# Patient Record
Sex: FEMALE | Race: WHITE | URBAN - METROPOLITAN AREA
[De-identification: names, ages, dates, MRNs, and addresses within clinical notes are randomized per-mention and may not be internally consistent; named-entity substitution may affect disease eponyms.]

---

## 2017-01-31 ENCOUNTER — OUTPATIENT (OUTPATIENT)
Dept: OUTPATIENT SERVICES | Facility: HOSPITAL | Age: 37
LOS: 1 days | Discharge: HOME | End: 2017-01-31

## 2017-06-12 ENCOUNTER — OUTPATIENT (OUTPATIENT)
Dept: OUTPATIENT SERVICES | Facility: HOSPITAL | Age: 37
LOS: 1 days | Discharge: HOME | End: 2017-06-12

## 2017-06-12 DIAGNOSIS — O99.89 OTHER SPECIFIED DISEASES AND CONDITIONS COMPLICATING PREGNANCY, CHILDBIRTH AND THE PUERPERIUM: ICD-10-CM

## 2017-06-28 DIAGNOSIS — F10.10 ALCOHOL ABUSE, UNCOMPLICATED: ICD-10-CM

## 2017-09-08 ENCOUNTER — OUTPATIENT (OUTPATIENT)
Dept: OUTPATIENT SERVICES | Facility: HOSPITAL | Age: 37
LOS: 1 days | Discharge: HOME | End: 2017-09-08

## 2017-09-08 DIAGNOSIS — F10.10 ALCOHOL ABUSE, UNCOMPLICATED: ICD-10-CM

## 2017-09-08 DIAGNOSIS — O99.89 OTHER SPECIFIED DISEASES AND CONDITIONS COMPLICATING PREGNANCY, CHILDBIRTH AND THE PUERPERIUM: ICD-10-CM

## 2017-09-12 ENCOUNTER — OUTPATIENT (OUTPATIENT)
Dept: OUTPATIENT SERVICES | Facility: HOSPITAL | Age: 37
LOS: 1 days | Discharge: HOME | End: 2017-09-12

## 2017-09-12 DIAGNOSIS — F10.10 ALCOHOL ABUSE, UNCOMPLICATED: ICD-10-CM

## 2017-09-12 DIAGNOSIS — O99.89 OTHER SPECIFIED DISEASES AND CONDITIONS COMPLICATING PREGNANCY, CHILDBIRTH AND THE PUERPERIUM: ICD-10-CM

## 2017-10-25 ENCOUNTER — OUTPATIENT (OUTPATIENT)
Dept: OUTPATIENT SERVICES | Facility: HOSPITAL | Age: 37
LOS: 1 days | Discharge: HOME | End: 2017-10-25

## 2017-10-25 DIAGNOSIS — F10.10 ALCOHOL ABUSE, UNCOMPLICATED: ICD-10-CM

## 2017-10-25 DIAGNOSIS — O99.89 OTHER SPECIFIED DISEASES AND CONDITIONS COMPLICATING PREGNANCY, CHILDBIRTH AND THE PUERPERIUM: ICD-10-CM

## 2017-12-20 ENCOUNTER — OUTPATIENT (OUTPATIENT)
Dept: OUTPATIENT SERVICES | Facility: HOSPITAL | Age: 37
LOS: 1 days | Discharge: HOME | End: 2017-12-20

## 2017-12-20 DIAGNOSIS — O99.89 OTHER SPECIFIED DISEASES AND CONDITIONS COMPLICATING PREGNANCY, CHILDBIRTH AND THE PUERPERIUM: ICD-10-CM

## 2017-12-20 DIAGNOSIS — F10.10 ALCOHOL ABUSE, UNCOMPLICATED: ICD-10-CM

## 2018-01-22 ENCOUNTER — OUTPATIENT (OUTPATIENT)
Dept: OUTPATIENT SERVICES | Facility: HOSPITAL | Age: 38
LOS: 1 days | Discharge: HOME | End: 2018-01-22

## 2018-01-22 DIAGNOSIS — F10.10 ALCOHOL ABUSE, UNCOMPLICATED: ICD-10-CM

## 2018-01-23 ENCOUNTER — EMERGENCY (EMERGENCY)
Facility: HOSPITAL | Age: 38
LOS: 0 days | Discharge: HOME | End: 2018-01-23

## 2018-01-23 DIAGNOSIS — F93.0 SEPARATION ANXIETY DISORDER OF CHILDHOOD: ICD-10-CM

## 2018-01-23 DIAGNOSIS — F17.200 NICOTINE DEPENDENCE, UNSPECIFIED, UNCOMPLICATED: ICD-10-CM

## 2018-01-23 DIAGNOSIS — F41.9 ANXIETY DISORDER, UNSPECIFIED: ICD-10-CM

## 2018-01-23 DIAGNOSIS — F10.20 ALCOHOL DEPENDENCE, UNCOMPLICATED: ICD-10-CM

## 2018-01-23 DIAGNOSIS — Z88.2 ALLERGY STATUS TO SULFONAMIDES: ICD-10-CM

## 2018-01-23 DIAGNOSIS — F32.9 MAJOR DEPRESSIVE DISORDER, SINGLE EPISODE, UNSPECIFIED: ICD-10-CM

## 2018-01-23 DIAGNOSIS — F13.20 SEDATIVE, HYPNOTIC OR ANXIOLYTIC DEPENDENCE, UNCOMPLICATED: ICD-10-CM

## 2018-02-02 ENCOUNTER — INPATIENT (INPATIENT)
Facility: HOSPITAL | Age: 38
LOS: 3 days | Discharge: HOME | End: 2018-02-06
Attending: INTERNAL MEDICINE

## 2018-02-02 DIAGNOSIS — F10.20 ALCOHOL DEPENDENCE, UNCOMPLICATED: ICD-10-CM

## 2018-02-02 DIAGNOSIS — F17.200 NICOTINE DEPENDENCE, UNSPECIFIED, UNCOMPLICATED: ICD-10-CM

## 2018-02-02 DIAGNOSIS — F41.1 GENERALIZED ANXIETY DISORDER: ICD-10-CM

## 2018-02-02 DIAGNOSIS — F33.2 MAJOR DEPRESSIVE DISORDER, RECURRENT SEVERE WITHOUT PSYCHOTIC FEATURES: ICD-10-CM

## 2018-02-02 DIAGNOSIS — F13.20 SEDATIVE, HYPNOTIC OR ANXIOLYTIC DEPENDENCE, UNCOMPLICATED: ICD-10-CM

## 2018-02-03 VITALS
HEIGHT: 68 IN | TEMPERATURE: 98 F | HEART RATE: 58 BPM | RESPIRATION RATE: 16 BRPM | SYSTOLIC BLOOD PRESSURE: 107 MMHG | WEIGHT: 184.97 LBS | DIASTOLIC BLOOD PRESSURE: 50 MMHG

## 2018-02-03 RX ORDER — PHENOBARBITAL 60 MG
32.4 TABLET ORAL ONCE
Qty: 0 | Refills: 0 | Status: DISCONTINUED | OUTPATIENT
Start: 2018-02-06 | End: 2018-02-06

## 2018-02-03 RX ORDER — NICOTINE POLACRILEX 2 MG
1 GUM BUCCAL DAILY
Qty: 0 | Refills: 0 | Status: DISCONTINUED | OUTPATIENT
Start: 2018-02-03 | End: 2018-02-06

## 2018-02-03 RX ORDER — THIAMINE MONONITRATE (VIT B1) 100 MG
100 TABLET ORAL DAILY
Qty: 0 | Refills: 0 | Status: COMPLETED | OUTPATIENT
Start: 2018-02-03 | End: 2018-02-06

## 2018-02-03 RX ORDER — PHENOBARBITAL 60 MG
32.4 TABLET ORAL EVERY 6 HOURS
Qty: 0 | Refills: 0 | Status: DISCONTINUED | OUTPATIENT
Start: 2018-02-04 | End: 2018-02-04

## 2018-02-03 RX ORDER — PHENOBARBITAL 60 MG
32.4 TABLET ORAL EVERY 4 HOURS
Qty: 0 | Refills: 0 | Status: DISCONTINUED | OUTPATIENT
Start: 2018-02-03 | End: 2018-02-06

## 2018-02-03 RX ORDER — ACETAMINOPHEN 500 MG
650 TABLET ORAL EVERY 4 HOURS
Qty: 0 | Refills: 0 | Status: DISCONTINUED | OUTPATIENT
Start: 2018-02-03 | End: 2018-02-06

## 2018-02-03 RX ORDER — IBUPROFEN 200 MG
400 TABLET ORAL EVERY 6 HOURS
Qty: 0 | Refills: 0 | Status: DISCONTINUED | OUTPATIENT
Start: 2018-02-03 | End: 2018-02-06

## 2018-02-03 RX ORDER — PSEUDOEPHEDRINE HCL 30 MG
60 TABLET ORAL EVERY 6 HOURS
Qty: 0 | Refills: 0 | Status: DISCONTINUED | OUTPATIENT
Start: 2018-02-03 | End: 2018-02-06

## 2018-02-03 RX ORDER — BUPRENORPHINE AND NALOXONE 2; .5 MG/1; MG/1
1 TABLET SUBLINGUAL
Qty: 0 | Refills: 0 | Status: DISCONTINUED | OUTPATIENT
Start: 2018-02-03 | End: 2018-02-06

## 2018-02-03 RX ORDER — HYDROXYZINE HCL 10 MG
100 TABLET ORAL AT BEDTIME
Qty: 0 | Refills: 0 | Status: DISCONTINUED | OUTPATIENT
Start: 2018-02-03 | End: 2018-02-06

## 2018-02-03 RX ORDER — HYDROXYZINE HCL 10 MG
50 TABLET ORAL EVERY 6 HOURS
Qty: 0 | Refills: 0 | Status: DISCONTINUED | OUTPATIENT
Start: 2018-02-03 | End: 2018-02-06

## 2018-02-03 RX ORDER — PANTOPRAZOLE SODIUM 20 MG/1
40 TABLET, DELAYED RELEASE ORAL DAILY
Qty: 0 | Refills: 0 | Status: DISCONTINUED | OUTPATIENT
Start: 2018-02-03 | End: 2018-02-06

## 2018-02-03 RX ORDER — BUPROPION HYDROCHLORIDE 150 MG/1
150 TABLET, EXTENDED RELEASE ORAL DAILY
Qty: 0 | Refills: 0 | Status: DISCONTINUED | OUTPATIENT
Start: 2018-02-03 | End: 2018-02-06

## 2018-02-03 RX ORDER — PHENOBARBITAL 60 MG
32.4 TABLET ORAL ONCE
Qty: 0 | Refills: 0 | Status: DISCONTINUED | OUTPATIENT
Start: 2018-02-05 | End: 2018-02-05

## 2018-02-03 RX ORDER — MULTIVIT-MIN/FERROUS GLUCONATE 9 MG/15 ML
1 LIQUID (ML) ORAL DAILY
Qty: 0 | Refills: 0 | Status: DISCONTINUED | OUTPATIENT
Start: 2018-02-03 | End: 2018-02-06

## 2018-02-03 RX ORDER — PHENOBARBITAL 60 MG
32.4 TABLET ORAL ONCE
Qty: 0 | Refills: 0 | Status: DISCONTINUED | OUTPATIENT
Start: 2018-02-04 | End: 2018-02-03

## 2018-02-03 RX ORDER — MAGNESIUM HYDROXIDE 400 MG/1
30 TABLET, CHEWABLE ORAL EVERY 8 HOURS
Qty: 0 | Refills: 0 | Status: DISCONTINUED | OUTPATIENT
Start: 2018-02-03 | End: 2018-02-06

## 2018-02-03 RX ADMIN — Medication 32.4 MILLIGRAM(S): at 23:12

## 2018-02-03 RX ADMIN — Medication 100 MILLIGRAM(S): at 23:15

## 2018-02-03 RX ADMIN — BUPRENORPHINE AND NALOXONE 1 TABLET(S): 2; .5 TABLET SUBLINGUAL at 22:00

## 2018-02-03 RX ADMIN — Medication 400 MILLIGRAM(S): at 23:14

## 2018-02-03 RX ADMIN — Medication 30 MILLIGRAM(S): at 22:01

## 2018-02-04 DIAGNOSIS — F17.200 NICOTINE DEPENDENCE, UNSPECIFIED, UNCOMPLICATED: ICD-10-CM

## 2018-02-04 DIAGNOSIS — F13.20 SEDATIVE, HYPNOTIC OR ANXIOLYTIC DEPENDENCE, UNCOMPLICATED: ICD-10-CM

## 2018-02-04 DIAGNOSIS — F10.20 ALCOHOL DEPENDENCE, UNCOMPLICATED: ICD-10-CM

## 2018-02-04 DIAGNOSIS — F41.9 ANXIETY DISORDER, UNSPECIFIED: ICD-10-CM

## 2018-02-04 DIAGNOSIS — F93.0 SEPARATION ANXIETY DISORDER OF CHILDHOOD: ICD-10-CM

## 2018-02-04 RX ADMIN — Medication 1 PATCH: at 09:46

## 2018-02-04 RX ADMIN — Medication 32.4 MILLIGRAM(S): at 06:04

## 2018-02-04 RX ADMIN — Medication 100 MILLIGRAM(S): at 21:18

## 2018-02-04 RX ADMIN — Medication 1 TABLET(S): at 09:45

## 2018-02-04 RX ADMIN — Medication 32.4 MILLIGRAM(S): at 17:27

## 2018-02-04 RX ADMIN — BUPRENORPHINE AND NALOXONE 1 TABLET(S): 2; .5 TABLET SUBLINGUAL at 21:14

## 2018-02-04 RX ADMIN — Medication 400 MILLIGRAM(S): at 12:49

## 2018-02-04 RX ADMIN — Medication 30 MILLIGRAM(S): at 09:45

## 2018-02-04 RX ADMIN — Medication 400 MILLIGRAM(S): at 21:15

## 2018-02-04 RX ADMIN — PANTOPRAZOLE SODIUM 40 MILLIGRAM(S): 20 TABLET, DELAYED RELEASE ORAL at 09:44

## 2018-02-04 RX ADMIN — BUPRENORPHINE AND NALOXONE 1 TABLET(S): 2; .5 TABLET SUBLINGUAL at 09:46

## 2018-02-04 RX ADMIN — Medication 32.4 MILLIGRAM(S): at 12:49

## 2018-02-04 RX ADMIN — Medication 30 MILLIGRAM(S): at 21:13

## 2018-02-04 RX ADMIN — BUPROPION HYDROCHLORIDE 150 MILLIGRAM(S): 150 TABLET, EXTENDED RELEASE ORAL at 09:45

## 2018-02-04 RX ADMIN — Medication 100 MILLIGRAM(S): at 09:44

## 2018-02-05 RX ORDER — ALPRAZOLAM 0.25 MG
1 TABLET ORAL
Qty: 0 | Refills: 0 | COMMUNITY

## 2018-02-05 RX ORDER — TRAZODONE HCL 50 MG
100 TABLET ORAL AT BEDTIME
Qty: 0 | Refills: 0 | Status: DISCONTINUED | OUTPATIENT
Start: 2018-02-05 | End: 2018-02-06

## 2018-02-05 RX ADMIN — Medication 1 TABLET(S): at 09:09

## 2018-02-05 RX ADMIN — Medication 100 MILLIGRAM(S): at 09:11

## 2018-02-05 RX ADMIN — Medication 32.4 MILLIGRAM(S): at 18:19

## 2018-02-05 RX ADMIN — BUPRENORPHINE AND NALOXONE 1 TABLET(S): 2; .5 TABLET SUBLINGUAL at 23:16

## 2018-02-05 RX ADMIN — BUPRENORPHINE AND NALOXONE 1 TABLET(S): 2; .5 TABLET SUBLINGUAL at 10:00

## 2018-02-05 RX ADMIN — BUPROPION HYDROCHLORIDE 150 MILLIGRAM(S): 150 TABLET, EXTENDED RELEASE ORAL at 09:10

## 2018-02-05 RX ADMIN — Medication 400 MILLIGRAM(S): at 22:55

## 2018-02-05 RX ADMIN — Medication 1 PATCH: at 09:00

## 2018-02-05 RX ADMIN — PANTOPRAZOLE SODIUM 40 MILLIGRAM(S): 20 TABLET, DELAYED RELEASE ORAL at 09:10

## 2018-02-05 RX ADMIN — Medication 32.4 MILLIGRAM(S): at 06:08

## 2018-02-05 RX ADMIN — Medication 30 MILLIGRAM(S): at 11:33

## 2018-02-05 RX ADMIN — Medication 1 PATCH: at 09:11

## 2018-02-05 RX ADMIN — Medication 100 MILLIGRAM(S): at 21:43

## 2018-02-05 RX ADMIN — BUPRENORPHINE AND NALOXONE 1 TABLET(S): 2; .5 TABLET SUBLINGUAL at 21:43

## 2018-02-05 RX ADMIN — Medication 400 MILLIGRAM(S): at 06:08

## 2018-02-05 RX ADMIN — BUPRENORPHINE AND NALOXONE 1 TABLET(S): 2; .5 TABLET SUBLINGUAL at 09:09

## 2018-02-05 RX ADMIN — Medication 30 MILLIGRAM(S): at 21:43

## 2018-02-05 RX ADMIN — Medication 400 MILLIGRAM(S): at 18:30

## 2018-02-05 NOTE — DISCHARGE NOTE ADULT - CARE PLAN
Principal Discharge DX:	Polysubstance (including opioids) dependence with physiol dependence  Goal:	stop using  Assessment and plan of treatment:	fup with aftercare

## 2018-02-05 NOTE — DISCHARGE NOTE ADULT - MEDICATION SUMMARY - MEDICATIONS TO TAKE
I will START or STAY ON the medications listed below when I get home from the hospital:    Suboxone 8 mg-2 mg sublingual film  -- 1 film(s) under tongue 2 times a day  -- Indication: For Sedative, hypnotic, or anxiolytic dependence, uncomplicated    BuSpar 10 mg oral tablet  -- 3 tab(s) by mouth 2 times a day  -- Indication: For Generalized anxiety disorder    Wellbutrin  mg/24 hours oral tablet, extended release  -- 1 tab(s) by mouth every 24 hours  -- Indication: For depression

## 2018-02-05 NOTE — DISCHARGE NOTE ADULT - PATIENT PORTAL LINK FT
You can access the [facility] Patient Portal, offered by [facility], by registering with the following website: [email or web address]

## 2018-02-06 VITALS
DIASTOLIC BLOOD PRESSURE: 50 MMHG | SYSTOLIC BLOOD PRESSURE: 94 MMHG | RESPIRATION RATE: 16 BRPM | TEMPERATURE: 98 F | HEART RATE: 69 BPM

## 2018-02-06 RX ADMIN — BUPROPION HYDROCHLORIDE 150 MILLIGRAM(S): 150 TABLET, EXTENDED RELEASE ORAL at 09:09

## 2018-02-06 RX ADMIN — Medication 1 PATCH: at 09:14

## 2018-02-06 RX ADMIN — Medication 1 TABLET(S): at 09:09

## 2018-02-06 RX ADMIN — Medication 100 MILLIGRAM(S): at 00:38

## 2018-02-06 RX ADMIN — Medication 30 MILLIGRAM(S): at 09:08

## 2018-02-06 RX ADMIN — Medication 400 MILLIGRAM(S): at 05:56

## 2018-02-06 RX ADMIN — Medication 100 MILLIGRAM(S): at 09:10

## 2018-02-06 RX ADMIN — PANTOPRAZOLE SODIUM 40 MILLIGRAM(S): 20 TABLET, DELAYED RELEASE ORAL at 09:09

## 2018-02-06 RX ADMIN — Medication 32.4 MILLIGRAM(S): at 05:56

## 2018-02-06 RX ADMIN — Medication 1 PATCH: at 09:10

## 2018-02-06 RX ADMIN — BUPRENORPHINE AND NALOXONE 1 TABLET(S): 2; .5 TABLET SUBLINGUAL at 09:10

## 2018-02-06 RX ADMIN — BUPRENORPHINE AND NALOXONE 1 TABLET(S): 2; .5 TABLET SUBLINGUAL at 09:13

## 2018-02-09 DIAGNOSIS — F17.200 NICOTINE DEPENDENCE, UNSPECIFIED, UNCOMPLICATED: ICD-10-CM

## 2018-02-09 DIAGNOSIS — F13.20 SEDATIVE, HYPNOTIC OR ANXIOLYTIC DEPENDENCE, UNCOMPLICATED: ICD-10-CM

## 2018-02-09 DIAGNOSIS — F41.1 GENERALIZED ANXIETY DISORDER: ICD-10-CM

## 2018-02-09 DIAGNOSIS — F33.2 MAJOR DEPRESSIVE DISORDER, RECURRENT SEVERE WITHOUT PSYCHOTIC FEATURES: ICD-10-CM

## 2018-02-09 DIAGNOSIS — F10.20 ALCOHOL DEPENDENCE, UNCOMPLICATED: ICD-10-CM

## 2018-02-09 DIAGNOSIS — F10.229 ALCOHOL DEPENDENCE WITH INTOXICATION, UNSPECIFIED: ICD-10-CM

## 2018-02-28 ENCOUNTER — OUTPATIENT (OUTPATIENT)
Dept: OUTPATIENT SERVICES | Facility: HOSPITAL | Age: 38
LOS: 1 days | Discharge: HOME | End: 2018-02-28

## 2018-02-28 DIAGNOSIS — F10.10 ALCOHOL ABUSE, UNCOMPLICATED: ICD-10-CM

## 2018-03-20 ENCOUNTER — OUTPATIENT (OUTPATIENT)
Dept: OUTPATIENT SERVICES | Facility: HOSPITAL | Age: 38
LOS: 1 days | Discharge: HOME | End: 2018-03-20

## 2018-03-20 DIAGNOSIS — F10.10 ALCOHOL ABUSE, UNCOMPLICATED: ICD-10-CM

## 2018-04-12 ENCOUNTER — EMERGENCY (EMERGENCY)
Facility: HOSPITAL | Age: 38
LOS: 0 days | Discharge: HOME | End: 2018-04-12
Attending: EMERGENCY MEDICINE

## 2018-04-12 VITALS
RESPIRATION RATE: 18 BRPM | WEIGHT: 169.98 LBS | HEART RATE: 74 BPM | DIASTOLIC BLOOD PRESSURE: 80 MMHG | OXYGEN SATURATION: 99 % | SYSTOLIC BLOOD PRESSURE: 147 MMHG | HEIGHT: 68 IN | TEMPERATURE: 97 F

## 2018-04-12 DIAGNOSIS — Z88.2 ALLERGY STATUS TO SULFONAMIDES: ICD-10-CM

## 2018-04-12 DIAGNOSIS — Z03.89 ENCOUNTER FOR OBSERVATION FOR OTHER SUSPECTED DISEASES AND CONDITIONS RULED OUT: ICD-10-CM

## 2018-04-12 DIAGNOSIS — F17.200 NICOTINE DEPENDENCE, UNSPECIFIED, UNCOMPLICATED: ICD-10-CM

## 2018-04-12 DIAGNOSIS — F32.9 MAJOR DEPRESSIVE DISORDER, SINGLE EPISODE, UNSPECIFIED: ICD-10-CM

## 2018-04-12 DIAGNOSIS — Z79.891 LONG TERM (CURRENT) USE OF OPIATE ANALGESIC: ICD-10-CM

## 2018-04-12 DIAGNOSIS — Z79.899 OTHER LONG TERM (CURRENT) DRUG THERAPY: ICD-10-CM

## 2018-04-12 NOTE — ED PROVIDER NOTE - PHYSICAL EXAMINATION
CONSTITUTIONAL: Well-developed; well-nourished; in no acute distress.   SKIN: warm, dry  HEAD: Normocephalic; atraumatic.  NECK: Supple; non tender.  CARD: S1, S2 normal; Regular rate and rhythm.   RESP: No wheezes, rales or rhonchi.  ABD: soft ntnd  EXT: Normal ROM.    NEURO: Alert, oriented, grossly unremarkable  PSYCH: Cooperative, appropriate. no SI/HI
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
## 2018-04-12 NOTE — ED PROVIDER NOTE - ATTENDING CONTRIBUTION TO CARE
s/w ems,  resident note reviewed-  agree with above  pt is denying any si hi  no hallucinatios no drug use etoh use tonight -  plan  clear by psychiatry

## 2018-05-14 ENCOUNTER — APPOINTMENT (OUTPATIENT)
Dept: OBGYN | Facility: CLINIC | Age: 38
End: 2018-05-14

## 2018-06-12 ENCOUNTER — OUTPATIENT (OUTPATIENT)
Dept: OUTPATIENT SERVICES | Facility: HOSPITAL | Age: 38
LOS: 1 days | Discharge: HOME | End: 2018-06-12

## 2018-06-12 DIAGNOSIS — F10.10 ALCOHOL ABUSE, UNCOMPLICATED: ICD-10-CM

## 2018-07-24 ENCOUNTER — OUTPATIENT (OUTPATIENT)
Dept: OUTPATIENT SERVICES | Facility: HOSPITAL | Age: 38
LOS: 1 days | Discharge: HOME | End: 2018-07-24

## 2018-07-24 DIAGNOSIS — F10.10 ALCOHOL ABUSE, UNCOMPLICATED: ICD-10-CM

## 2018-07-27 ENCOUNTER — OUTPATIENT (OUTPATIENT)
Dept: OUTPATIENT SERVICES | Facility: HOSPITAL | Age: 38
LOS: 1 days | Discharge: HOME | End: 2018-07-27

## 2018-07-27 DIAGNOSIS — F10.10 ALCOHOL ABUSE, UNCOMPLICATED: ICD-10-CM

## 2018-07-31 PROBLEM — F32.9 MAJOR DEPRESSIVE DISORDER, SINGLE EPISODE, UNSPECIFIED: Chronic | Status: ACTIVE | Noted: 2018-04-12

## 2018-08-29 ENCOUNTER — OUTPATIENT (OUTPATIENT)
Dept: OUTPATIENT SERVICES | Facility: HOSPITAL | Age: 38
LOS: 1 days | Discharge: HOME | End: 2018-08-29

## 2018-08-29 DIAGNOSIS — F10.10 ALCOHOL ABUSE, UNCOMPLICATED: ICD-10-CM

## 2018-10-05 ENCOUNTER — OUTPATIENT (OUTPATIENT)
Dept: OUTPATIENT SERVICES | Facility: HOSPITAL | Age: 38
LOS: 1 days | Discharge: HOME | End: 2018-10-05

## 2018-10-05 DIAGNOSIS — F10.10 ALCOHOL ABUSE, UNCOMPLICATED: ICD-10-CM

## 2018-11-09 ENCOUNTER — OUTPATIENT (OUTPATIENT)
Dept: OUTPATIENT SERVICES | Facility: HOSPITAL | Age: 38
LOS: 1 days | Discharge: HOME | End: 2018-11-09

## 2018-11-09 DIAGNOSIS — F10.10 ALCOHOL ABUSE, UNCOMPLICATED: ICD-10-CM

## 2018-12-02 ENCOUNTER — EMERGENCY (EMERGENCY)
Facility: HOSPITAL | Age: 38
LOS: 0 days | Discharge: HOME | End: 2018-12-02
Attending: EMERGENCY MEDICINE | Admitting: EMERGENCY MEDICINE
Payer: MEDICAID

## 2018-12-02 VITALS
RESPIRATION RATE: 18 BRPM | TEMPERATURE: 98 F | DIASTOLIC BLOOD PRESSURE: 71 MMHG | WEIGHT: 169.98 LBS | HEART RATE: 89 BPM | HEIGHT: 68 IN | SYSTOLIC BLOOD PRESSURE: 121 MMHG

## 2018-12-02 VITALS
OXYGEN SATURATION: 100 % | DIASTOLIC BLOOD PRESSURE: 84 MMHG | SYSTOLIC BLOOD PRESSURE: 116 MMHG | TEMPERATURE: 98 F | HEART RATE: 82 BPM | RESPIRATION RATE: 18 BRPM

## 2018-12-02 DIAGNOSIS — M25.561 PAIN IN RIGHT KNEE: ICD-10-CM
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
## 2018-12-02 DIAGNOSIS — M25.461 EFFUSION, RIGHT KNEE: ICD-10-CM

## 2018-12-02 DIAGNOSIS — Z88.2 ALLERGY STATUS TO SULFONAMIDES: ICD-10-CM

## 2018-12-02 DIAGNOSIS — F32.9 MAJOR DEPRESSIVE DISORDER, SINGLE EPISODE, UNSPECIFIED: ICD-10-CM

## 2018-12-02 DIAGNOSIS — F17.200 NICOTINE DEPENDENCE, UNSPECIFIED, UNCOMPLICATED: ICD-10-CM
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
## 2018-12-02 DIAGNOSIS — M25.569 PAIN IN UNSPECIFIED KNEE: ICD-10-CM

## 2018-12-02 DIAGNOSIS — Z79.899 OTHER LONG TERM (CURRENT) DRUG THERAPY: ICD-10-CM
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
## 2018-12-02 PROCEDURE — 93970 EXTREMITY STUDY: CPT | Mod: 26

## 2018-12-02 NOTE — ED PROVIDER NOTE - OBJECTIVE STATEMENT
39 y/o F with PMH substance abuse on Suboxone presents with R knee swelling x 2 days after overexerting herself at the gym the week prior. no trauma. no paresthesias. Denies CP, SOB, back pain, abdominal pain, n/v/d, fevers, fall, cough, recent travel, recent illness, sick contacts, leg pain, urinary symptoms, rash. +smoker. no hormone therapy, recent surgeries/immobilizations, cancers.

## 2018-12-02 NOTE — ED ADULT NURSE NOTE - NSIMPLEMENTINTERV_GEN_ALL_ED
Implemented All Universal Safety Interventions:  [place] to call system. Call bell, personal items and telephone within reach. Instruct patient to call for assistance. Room bathroom lighting operational. Non-slip footwear when patient is off stretcher. Physically safe environment: no spills, clutter or unnecessary equipment. Stretcher in lowest position, wheels locked, appropriate side rails in place.

## 2018-12-02 NOTE — ED PROVIDER NOTE - MEDICAL DECISION MAKING DETAILS
I personally evaluated the patient. I reviewed the Resident’s or Physician Assistant’s note (as assigned above), and agree with the findings and plan except as documented in my note.  Chart reviewed. H/O substance abuse on Suboxone, presents with right knee/leg swelling for 2 days. No trauma. Exam shows clear lungs, abdomen soft NT +BS, swollen right knee with effusion, FROM, no tenderness or warmth. XR right knee negative. Venous duplex no DVT. Given Ace wrap and referred to ortho.

## 2018-12-02 NOTE — ED PROVIDER NOTE - NSFOLLOWUPINSTRUCTIONS_ED_ALL_ED_FT
Knee Sprain, Adult  ImageA knee sprain is a stretch or tear in a knee ligament. Knee ligaments are bands of tissue that connect bones in the knee to each other.    What are the causes?  This condition often results from:    A fall.  An injury to the knee.    What are the signs or symptoms?  Symptoms of this condition include:    Trouble bending the leg.  Swelling in the knee.  Bruising around the knee.  Tenderness or pain in the knee.  Muscle spasms around the knee.    How is this diagnosed?  This condition may be diagnosed based on:    A physical exam.  What happened just before you started to have symptoms.  Tests, including:    An X-ray. This may be done to make sure no bones are broken.  An MRI. This may be done to check if the ligament is torn.  Stress testing of the knee. This may be done to check ligament damage.      How is this treated?  Treatment for this condition may involve:    Keeping the knee still (immobilized) with a cast, brace, or splint.  Applying ice to the knee. This helps with pain and swelling.  Keeping the knee raised (elevated) above the level of your heart when you are resting. This helps with pain and swelling.  Taking medicine for pain.  Exercises to prevent or limit permanent weakness or stiffness in your knee.  Surgery to reconnect the ligament to the bone or to reconstruct it. This may be needed if the ligament tore all the way.    Follow these instructions at home:  If you have a splint or brace:     Wear the splint or brace as told by your health care provider. Remove it only as told by your health care provider.  Loosen the splint or brace if your toes tingle, become numb, or turn cold and blue.  Keep the splint or brace clean.  If the splint or brace is not waterproof:    Do not let it get wet.  Cover it with a watertight covering when you take a bath or a shower.    If you have a cast:     Do not stick anything inside the cast to scratch your skin. Doing that increases your risk of infection.  Check the skin around the cast every day. Tell your health care provider about any concerns.  You may put lotion on dry skin around the edges of the cast. Do not put lotion on the skin underneath the cast.  Keep the cast clean.  If the cast is not waterproof:    Do not let it get wet.  Cover it with a watertight covering when you take a bath or a shower.    Managing pain, stiffness, and swelling     Image   If directed, put ice on the injured area.    If you have a removable splint or brace, remove it as told by your health care provider.  Put ice in a plastic bag.  Place a towel between your skin and the bag or between your cast and the bag.  Leave the ice on for 20 minutes, 2–3 times a day.    Gently move your toes often to avoid stiffness and to lessen swelling.  Elevate the injured area above the level of your heart while you are sitting or lying down.  Take over-the-counter and prescription medicines only as told by your health care provider.  General instructions     Do exercises as told by your health care provider.  Keep all follow-up visits as told by your health care provider. This is important.  Contact a health care provider if:  You have pain that gets worse.  The cast, brace, or splint does not fit right.  The cast, brace, or splint gets damaged.  Get help right away if:  You cannot use your injured joint to support any of your body weight (cannot bear weight).  You cannot move the injured joint.  You cannot walk more than a few steps without pain or without your knee buckling.  You have significant pain, swelling, or numbness below the cast, brace, or splint.  This information is not intended to replace advice given to you by your health care provider. Make sure you discuss any questions you have with your health care provider.

## 2018-12-02 NOTE — ED ADULT TRIAGE NOTE - CHIEF COMPLAINT QUOTE
Pt complaining of right knee pain. "Since last night I have had pain from my knee into my thigh, my knee is swollen, sometimes it almost feels numb"

## 2018-12-02 NOTE — ED PROVIDER NOTE - CARE PROVIDER_API CALL
Lebron Casey (MD), Orthopaedic Surgery  [facility] [place] [address]  Phone: [phone]  Fax: [phone]

## 2018-12-02 NOTE — ED PROVIDER NOTE - PHYSICAL EXAMINATION
PHYSICAL EXAM:    GENERAL: Alert, appears stated age, well appearing, non-toxic  SKIN: Warm, pink and dry. MMM.   EYE: Normal lids/conjunctiva  ENT: Normal hearing, patent oropharynx  NECK: +supple. No meningismus  Pulm: Bilateral BS, normal resp effort, no wheezes, stridor, or retractions  CV: RRR, no M/R/G, 2+and = DP/PT pulses  Abd: soft, non-tender, non-distended  Mskel: no erythema, cyanosis. no calf tenderness. no TTP. FROM throughout with each joint isolated. +pre-patellar swelling.   Neuro: AAOx3, no sensory/motor deficits, 5/5 strength throughout. normal gait.

## 2018-12-02 NOTE — ED PROVIDER NOTE - NS ED ROS FT
Review of Systems    Constitutional: (-) fever  Cardiovascular: (-) chest pain, (-) syncope  Respiratory: (-) cough, (-) shortness of breath  Gastrointestinal: (-) vomiting, (-) diarrhea  Musculoskeletal: (-) neck pain  Integumentary: (-) rash  Neurological: (-) headache

## 2018-12-14 ENCOUNTER — OUTPATIENT (OUTPATIENT)
Dept: OUTPATIENT SERVICES | Facility: HOSPITAL | Age: 38
LOS: 1 days | Discharge: HOME | End: 2018-12-14

## 2018-12-14 DIAGNOSIS — F10.10 ALCOHOL ABUSE, UNCOMPLICATED: ICD-10-CM

## 2018-12-21 ENCOUNTER — OUTPATIENT (OUTPATIENT)
Dept: OUTPATIENT SERVICES | Facility: HOSPITAL | Age: 38
LOS: 1 days | Discharge: HOME | End: 2018-12-21

## 2018-12-21 DIAGNOSIS — F10.10 ALCOHOL ABUSE, UNCOMPLICATED: ICD-10-CM

## 2019-01-08 ENCOUNTER — OUTPATIENT (OUTPATIENT)
Dept: OUTPATIENT SERVICES | Facility: HOSPITAL | Age: 39
LOS: 1 days | Discharge: HOME | End: 2019-01-08

## 2019-01-08 DIAGNOSIS — F10.10 ALCOHOL ABUSE, UNCOMPLICATED: ICD-10-CM

## 2019-01-11 ENCOUNTER — OUTPATIENT (OUTPATIENT)
Dept: OUTPATIENT SERVICES | Facility: HOSPITAL | Age: 39
LOS: 1 days | Discharge: HOME | End: 2019-01-11

## 2019-01-11 DIAGNOSIS — F10.10 ALCOHOL ABUSE, UNCOMPLICATED: ICD-10-CM

## 2019-01-18 ENCOUNTER — OUTPATIENT (OUTPATIENT)
Dept: OUTPATIENT SERVICES | Facility: HOSPITAL | Age: 39
LOS: 1 days | Discharge: HOME | End: 2019-01-18

## 2019-01-18 DIAGNOSIS — F10.10 ALCOHOL ABUSE, UNCOMPLICATED: ICD-10-CM

## 2019-01-22 ENCOUNTER — OUTPATIENT (OUTPATIENT)
Dept: OUTPATIENT SERVICES | Facility: HOSPITAL | Age: 39
LOS: 1 days | Discharge: HOME | End: 2019-01-22

## 2019-01-22 DIAGNOSIS — F10.10 ALCOHOL ABUSE, UNCOMPLICATED: ICD-10-CM

## 2019-01-25 ENCOUNTER — OUTPATIENT (OUTPATIENT)
Dept: OUTPATIENT SERVICES | Facility: HOSPITAL | Age: 39
LOS: 1 days | Discharge: HOME | End: 2019-01-25

## 2019-01-25 DIAGNOSIS — F10.10 ALCOHOL ABUSE, UNCOMPLICATED: ICD-10-CM

## 2019-01-29 ENCOUNTER — OUTPATIENT (OUTPATIENT)
Dept: OUTPATIENT SERVICES | Facility: HOSPITAL | Age: 39
LOS: 1 days | Discharge: HOME | End: 2019-01-29

## 2019-01-29 DIAGNOSIS — F10.10 ALCOHOL ABUSE, UNCOMPLICATED: ICD-10-CM

## 2019-02-01 ENCOUNTER — OUTPATIENT (OUTPATIENT)
Dept: OUTPATIENT SERVICES | Facility: HOSPITAL | Age: 39
LOS: 1 days | Discharge: HOME | End: 2019-02-01

## 2019-02-01 DIAGNOSIS — F11.20 OPIOID DEPENDENCE, UNCOMPLICATED: ICD-10-CM

## 2019-02-05 ENCOUNTER — OUTPATIENT (OUTPATIENT)
Dept: OUTPATIENT SERVICES | Facility: HOSPITAL | Age: 39
LOS: 1 days | Discharge: HOME | End: 2019-02-05

## 2019-02-05 DIAGNOSIS — F10.10 ALCOHOL ABUSE, UNCOMPLICATED: ICD-10-CM

## 2019-02-12 ENCOUNTER — OUTPATIENT (OUTPATIENT)
Dept: OUTPATIENT SERVICES | Facility: HOSPITAL | Age: 39
LOS: 1 days | Discharge: HOME | End: 2019-02-12

## 2019-02-12 DIAGNOSIS — F10.10 ALCOHOL ABUSE, UNCOMPLICATED: ICD-10-CM

## 2019-02-26 ENCOUNTER — OUTPATIENT (OUTPATIENT)
Dept: OUTPATIENT SERVICES | Facility: HOSPITAL | Age: 39
LOS: 1 days | Discharge: HOME | End: 2019-02-26

## 2019-02-26 DIAGNOSIS — F10.10 ALCOHOL ABUSE, UNCOMPLICATED: ICD-10-CM

## 2019-03-01 ENCOUNTER — OUTPATIENT (OUTPATIENT)
Dept: OUTPATIENT SERVICES | Facility: HOSPITAL | Age: 39
LOS: 1 days | Discharge: HOME | End: 2019-03-01

## 2019-03-01 DIAGNOSIS — F10.10 ALCOHOL ABUSE, UNCOMPLICATED: ICD-10-CM

## 2019-03-08 ENCOUNTER — OUTPATIENT (OUTPATIENT)
Dept: OUTPATIENT SERVICES | Facility: HOSPITAL | Age: 39
LOS: 1 days | Discharge: HOME | End: 2019-03-08

## 2019-03-08 DIAGNOSIS — F10.10 ALCOHOL ABUSE, UNCOMPLICATED: ICD-10-CM

## 2019-03-12 ENCOUNTER — OUTPATIENT (OUTPATIENT)
Dept: OUTPATIENT SERVICES | Facility: HOSPITAL | Age: 39
LOS: 1 days | Discharge: HOME | End: 2019-03-12

## 2019-03-12 DIAGNOSIS — F11.20 OPIOID DEPENDENCE, UNCOMPLICATED: ICD-10-CM

## 2019-03-15 ENCOUNTER — OUTPATIENT (OUTPATIENT)
Dept: OUTPATIENT SERVICES | Facility: HOSPITAL | Age: 39
LOS: 1 days | Discharge: HOME | End: 2019-03-15

## 2019-03-15 DIAGNOSIS — F10.10 ALCOHOL ABUSE, UNCOMPLICATED: ICD-10-CM

## 2019-04-02 ENCOUNTER — OUTPATIENT (OUTPATIENT)
Dept: OUTPATIENT SERVICES | Facility: HOSPITAL | Age: 39
LOS: 1 days | Discharge: HOME | End: 2019-04-02

## 2019-04-02 DIAGNOSIS — F10.10 ALCOHOL ABUSE, UNCOMPLICATED: ICD-10-CM

## 2019-04-04 ENCOUNTER — OUTPATIENT (OUTPATIENT)
Dept: OUTPATIENT SERVICES | Facility: HOSPITAL | Age: 39
LOS: 1 days | Discharge: HOME | End: 2019-04-04

## 2019-04-04 DIAGNOSIS — E55.9 VITAMIN D DEFICIENCY, UNSPECIFIED: ICD-10-CM

## 2019-04-04 DIAGNOSIS — N18.2 CHRONIC KIDNEY DISEASE, STAGE 2 (MILD): ICD-10-CM

## 2019-04-04 DIAGNOSIS — E11.9 TYPE 2 DIABETES MELLITUS WITHOUT COMPLICATIONS: ICD-10-CM

## 2019-04-04 DIAGNOSIS — R70.0 ELEVATED ERYTHROCYTE SEDIMENTATION RATE: ICD-10-CM

## 2019-04-04 DIAGNOSIS — K76.89 OTHER SPECIFIED DISEASES OF LIVER: ICD-10-CM

## 2019-04-04 DIAGNOSIS — D64.9 ANEMIA, UNSPECIFIED: ICD-10-CM

## 2019-04-16 ENCOUNTER — OUTPATIENT (OUTPATIENT)
Dept: OUTPATIENT SERVICES | Facility: HOSPITAL | Age: 39
LOS: 1 days | Discharge: HOME | End: 2019-04-16

## 2019-04-16 DIAGNOSIS — F10.10 ALCOHOL ABUSE, UNCOMPLICATED: ICD-10-CM

## 2019-04-19 ENCOUNTER — OUTPATIENT (OUTPATIENT)
Dept: OUTPATIENT SERVICES | Facility: HOSPITAL | Age: 39
LOS: 1 days | Discharge: HOME | End: 2019-04-19

## 2019-04-19 DIAGNOSIS — F10.10 ALCOHOL ABUSE, UNCOMPLICATED: ICD-10-CM

## 2019-05-02 ENCOUNTER — OUTPATIENT (OUTPATIENT)
Dept: OUTPATIENT SERVICES | Facility: HOSPITAL | Age: 39
LOS: 1 days | Discharge: HOME | End: 2019-05-02
Payer: MEDICAID

## 2019-05-02 DIAGNOSIS — F10.10 ALCOHOL ABUSE, UNCOMPLICATED: ICD-10-CM

## 2019-05-02 PROCEDURE — 99214 OFFICE O/P EST MOD 30 MIN: CPT

## 2019-05-03 ENCOUNTER — OUTPATIENT (OUTPATIENT)
Dept: OUTPATIENT SERVICES | Facility: HOSPITAL | Age: 39
LOS: 1 days | Discharge: HOME | End: 2019-05-03

## 2019-05-03 DIAGNOSIS — F10.10 ALCOHOL ABUSE, UNCOMPLICATED: ICD-10-CM

## 2019-05-09 ENCOUNTER — OUTPATIENT (OUTPATIENT)
Dept: OUTPATIENT SERVICES | Facility: HOSPITAL | Age: 39
LOS: 1 days | Discharge: HOME | End: 2019-05-09

## 2019-05-09 DIAGNOSIS — F10.10 ALCOHOL ABUSE, UNCOMPLICATED: ICD-10-CM

## 2019-05-13 ENCOUNTER — OUTPATIENT (OUTPATIENT)
Dept: OUTPATIENT SERVICES | Facility: HOSPITAL | Age: 39
LOS: 1 days | Discharge: HOME | End: 2019-05-13
Payer: MEDICAID

## 2019-05-13 DIAGNOSIS — F10.10 ALCOHOL ABUSE, UNCOMPLICATED: ICD-10-CM

## 2019-05-13 PROCEDURE — 99214 OFFICE O/P EST MOD 30 MIN: CPT

## 2019-05-14 ENCOUNTER — OUTPATIENT (OUTPATIENT)
Dept: OUTPATIENT SERVICES | Facility: HOSPITAL | Age: 39
LOS: 1 days | Discharge: HOME | End: 2019-05-14

## 2019-05-14 DIAGNOSIS — F10.10 ALCOHOL ABUSE, UNCOMPLICATED: ICD-10-CM

## 2019-05-16 ENCOUNTER — EMERGENCY (EMERGENCY)
Facility: HOSPITAL | Age: 39
LOS: 0 days | Discharge: HOME | End: 2019-05-16
Attending: EMERGENCY MEDICINE | Admitting: EMERGENCY MEDICINE
Payer: MEDICAID

## 2019-05-16 VITALS
HEIGHT: 68 IN | HEART RATE: 90 BPM | WEIGHT: 184.97 LBS | DIASTOLIC BLOOD PRESSURE: 60 MMHG | RESPIRATION RATE: 18 BRPM | OXYGEN SATURATION: 100 % | SYSTOLIC BLOOD PRESSURE: 104 MMHG | TEMPERATURE: 98 F

## 2019-05-16 DIAGNOSIS — K08.89 OTHER SPECIFIED DISORDERS OF TEETH AND SUPPORTING STRUCTURES: ICD-10-CM

## 2019-05-16 DIAGNOSIS — Z79.899 OTHER LONG TERM (CURRENT) DRUG THERAPY: ICD-10-CM

## 2019-05-16 DIAGNOSIS — F32.9 MAJOR DEPRESSIVE DISORDER, SINGLE EPISODE, UNSPECIFIED: ICD-10-CM

## 2019-05-16 DIAGNOSIS — Z88.2 ALLERGY STATUS TO SULFONAMIDES: ICD-10-CM

## 2019-05-16 DIAGNOSIS — K04.7 PERIAPICAL ABSCESS WITHOUT SINUS: ICD-10-CM

## 2019-05-16 PROCEDURE — 99283 EMERGENCY DEPT VISIT LOW MDM: CPT

## 2019-05-16 RX ORDER — BUPRENORPHINE AND NALOXONE 2; .5 MG/1; MG/1
1 TABLET SUBLINGUAL
Qty: 0 | Refills: 0 | DISCHARGE

## 2019-05-16 NOTE — ED PROVIDER NOTE - NSFOLLOWUPCLINICS_GEN_ALL_ED_FT
[facility]  Dental  [address] [place] [id number]  Phone: [phone]  Fax:   Follow Up Time: 1-3 Days

## 2019-05-16 NOTE — ED PROVIDER NOTE - OBJECTIVE STATEMENT
37 yo female presents for a dental abscess. the abscess present 1 week prior to her L jaw with inflammation and pain that radiating to her ear and chin. she was seen by her pcp on Tuesday and started on amoxicillin. she was still having pain up until earlier today when the abscess started to drain. she now denies pain to the area as well as any other symptoms including fevers, chills, n/v, chest pain, or SOB.

## 2019-05-16 NOTE — ED PROVIDER NOTE - NSFOLLOWUPINSTRUCTIONS_ED_ALL_ED_FT
Dental Abscess  Image   A dental abscess is an area of pus in or around a tooth. It comes from an infection. It can cause pain and other symptoms. Treatment will help with symptoms and prevent the infection from spreading.    Follow these instructions at home:  Medicines     Take over-the-counter and prescription medicines only as told by your dentist.  If you were prescribed an antibiotic medicine, take it as told by your dentist. Do not stop taking it even if you start to feel better.  If you were prescribed a gel that has numbing medicine in it, use it exactly as told.  Do not drive or use heavy machinery (like a ) while taking prescription pain medicine.  General instructions     Rinse out your mouth often with salt water.  To make salt water, dissolve ½–1 tsp of salt in 1 cup of warm water.  Eat a soft diet while your mouth is healing.  Drink enough fluid to keep your urine pale yellow.  Do not apply heat to the outside of your mouth.  Do not use any products that contain nicotine or tobacco. These include cigarettes and e-cigarettes. If you need help quitting, ask your doctor.  Keep all follow-up visits as told by your dentist. This is important.  Prevent an abscess     Brush your teeth every morning and every night. Use fluoride toothpaste.  Floss your teeth each day.  Get dental cleanings as often as told by your dentist.  Think about getting dental sealant put on teeth that have deep holes (decay).  Drink water that has fluoride in it.  Most tap water has fluoride.  Check the label on bottled water to see if it has fluoride in it.  Drink water instead of sugary drinks.   Eat healthy meals and snacks.   Wear a mouth guard or face shield when you play sports.  Contact a doctor if:  Your pain is worse, and medicine does not help.  Get help right away if:  You have a fever or chills.  Your symptoms suddenly get worse.  You have a very bad headache.  You have problems breathing or swallowing.  You have trouble opening your mouth.  You have swelling in your neck or close to your eye.  Summary  A dental abscess is an area of pus in or around a tooth. It is caused by an infection.  Treatment will help with symptoms and prevent the infection from spreading.  Take over-the-counter and prescription medicines only as told by your dentist.  To prevent an abscess, take good care of your teeth. Brush your teeth every morning and night. Use floss every day.   Get dental cleanings as often as told by your dentist.  This information is not intended to replace advice given to you by your health care provider. Make sure you discuss any questions you have with your health care provider.

## 2019-05-16 NOTE — ED PROVIDER NOTE - NS ED ROS FT
Constitutional: (-) fever  Eyes/ENT: (-) blurry vision, (-) epistaxis, L dental abscess  Cardiovascular: (-) chest pain, (-) syncope  Respiratory: (-) cough, (-) shortness of breath  Gastrointestinal: (-) vomiting, (-) diarrhea  Genitourinary: (-) dysuria, (-) hesitancy, (-) frequency   Musculoskeletal: (-) neck pain, (-) back pain, (-) joint pain  Integumentary: (-) rash, (-) edema  Neurological: (-) headache, (-) altered mental status  Allergic/Immunologic: (-) pruritus

## 2019-05-16 NOTE — ED ADULT TRIAGE NOTE - CHIEF COMPLAINT QUOTE
Wound check for left lower jaw abscess, patient feels that it is getting better but would like it to be checked, placed on ABX by primary Amoxicillin 916

## 2019-05-16 NOTE — ED PROVIDER NOTE - PHYSICAL EXAMINATION
Gen: NAD, AOx3  Head: NCAT  HEENT: PERRL, oral mucosa moist, normal conjunctiva, oropharynx clear without exudate or erythema  poor dentition. non-tender dental abscess to L jaw. no sign of erythema or edema to face  Lung: CTAB, no respiratory distress, no wheezing, rales, rhonchi  CV: normal s1/s2, rrr, Normal perfusion, pulses 2+ throughout  Abd: soft, NTND, no CVA tenderness  Neuro: alert and orientated x3   Skin: No rash   Psych: normal affect

## 2019-05-16 NOTE — ED PROVIDER NOTE - ATTENDING CONTRIBUTION TO CARE
38yr old female here for eval of left lower jaw swelling. pt reports has known poor dentition, is seeing dentist next friday to being process of corrected. pt started having swelling earlier in the week. seen by pmd given amox. pt reports abx helping keep things at bay but had pain, and swelling. pt with left lower jaw swelling that started to spontaneously drain. pt reports swelling now improved. no fever, chills. on exam pt with swelling to left lower jaw, poor dention, no ttp, no palpable drainable collection. normal speech.     impression improving dentla abscess, will provide abx until pt to be seen by dentist,.

## 2019-06-04 ENCOUNTER — OUTPATIENT (OUTPATIENT)
Dept: OUTPATIENT SERVICES | Facility: HOSPITAL | Age: 39
LOS: 1 days | Discharge: HOME | End: 2019-06-04

## 2019-06-04 DIAGNOSIS — F10.10 ALCOHOL ABUSE, UNCOMPLICATED: ICD-10-CM

## 2019-06-18 ENCOUNTER — OUTPATIENT (OUTPATIENT)
Dept: OUTPATIENT SERVICES | Facility: HOSPITAL | Age: 39
LOS: 1 days | Discharge: HOME | End: 2019-06-18

## 2019-06-18 DIAGNOSIS — F10.10 ALCOHOL ABUSE, UNCOMPLICATED: ICD-10-CM

## 2019-07-02 ENCOUNTER — OUTPATIENT (OUTPATIENT)
Dept: OUTPATIENT SERVICES | Facility: HOSPITAL | Age: 39
LOS: 1 days | Discharge: HOME | End: 2019-07-02

## 2019-07-02 DIAGNOSIS — F10.10 ALCOHOL ABUSE, UNCOMPLICATED: ICD-10-CM

## 2019-07-16 ENCOUNTER — OUTPATIENT (OUTPATIENT)
Dept: OUTPATIENT SERVICES | Facility: HOSPITAL | Age: 39
LOS: 1 days | Discharge: HOME | End: 2019-07-16

## 2019-07-16 DIAGNOSIS — F10.10 ALCOHOL ABUSE, UNCOMPLICATED: ICD-10-CM

## 2019-07-23 ENCOUNTER — OUTPATIENT (OUTPATIENT)
Dept: OUTPATIENT SERVICES | Facility: HOSPITAL | Age: 39
LOS: 1 days | Discharge: HOME | End: 2019-07-23

## 2019-07-23 DIAGNOSIS — F10.10 ALCOHOL ABUSE, UNCOMPLICATED: ICD-10-CM

## 2019-07-30 ENCOUNTER — OUTPATIENT (OUTPATIENT)
Dept: OUTPATIENT SERVICES | Facility: HOSPITAL | Age: 39
LOS: 1 days | Discharge: HOME | End: 2019-07-30
Payer: MEDICAID

## 2019-07-30 DIAGNOSIS — F10.10 ALCOHOL ABUSE, UNCOMPLICATED: ICD-10-CM

## 2019-07-30 PROCEDURE — 99214 OFFICE O/P EST MOD 30 MIN: CPT

## 2019-07-31 NOTE — ED PROVIDER NOTE - NS ED ROS FT
Respiratory:  No cough  MS:   joint pain or back pain.  Neuro:  No headache o  Skin:  No skin rash.
no

## 2019-08-01 ENCOUNTER — OUTPATIENT (OUTPATIENT)
Dept: OUTPATIENT SERVICES | Facility: HOSPITAL | Age: 39
LOS: 1 days | Discharge: HOME | End: 2019-08-01

## 2019-08-01 DIAGNOSIS — F10.10 ALCOHOL ABUSE, UNCOMPLICATED: ICD-10-CM

## 2019-08-06 ENCOUNTER — OUTPATIENT (OUTPATIENT)
Dept: OUTPATIENT SERVICES | Facility: HOSPITAL | Age: 39
LOS: 1 days | Discharge: HOME | End: 2019-08-06

## 2019-08-06 DIAGNOSIS — F10.10 ALCOHOL ABUSE, UNCOMPLICATED: ICD-10-CM

## 2019-08-13 ENCOUNTER — OUTPATIENT (OUTPATIENT)
Dept: OUTPATIENT SERVICES | Facility: HOSPITAL | Age: 39
LOS: 1 days | Discharge: HOME | End: 2019-08-13

## 2019-08-13 DIAGNOSIS — F10.10 ALCOHOL ABUSE, UNCOMPLICATED: ICD-10-CM

## 2019-08-22 ENCOUNTER — OUTPATIENT (OUTPATIENT)
Dept: OUTPATIENT SERVICES | Facility: HOSPITAL | Age: 39
LOS: 1 days | Discharge: HOME | End: 2019-08-22

## 2019-08-22 DIAGNOSIS — F10.10 ALCOHOL ABUSE, UNCOMPLICATED: ICD-10-CM

## 2019-08-29 ENCOUNTER — OUTPATIENT (OUTPATIENT)
Dept: OUTPATIENT SERVICES | Facility: HOSPITAL | Age: 39
LOS: 1 days | Discharge: HOME | End: 2019-08-29

## 2019-08-29 DIAGNOSIS — F10.10 ALCOHOL ABUSE, UNCOMPLICATED: ICD-10-CM

## 2019-09-03 ENCOUNTER — OUTPATIENT (OUTPATIENT)
Dept: OUTPATIENT SERVICES | Facility: HOSPITAL | Age: 39
LOS: 1 days | Discharge: HOME | End: 2019-09-03

## 2019-09-03 DIAGNOSIS — F10.10 ALCOHOL ABUSE, UNCOMPLICATED: ICD-10-CM

## 2019-09-05 ENCOUNTER — OUTPATIENT (OUTPATIENT)
Dept: OUTPATIENT SERVICES | Facility: HOSPITAL | Age: 39
LOS: 1 days | Discharge: HOME | End: 2019-09-05
Payer: MEDICAID

## 2019-09-05 DIAGNOSIS — F10.10 ALCOHOL ABUSE, UNCOMPLICATED: ICD-10-CM

## 2019-09-05 PROCEDURE — 99214 OFFICE O/P EST MOD 30 MIN: CPT

## 2019-09-10 ENCOUNTER — OUTPATIENT (OUTPATIENT)
Dept: OUTPATIENT SERVICES | Facility: HOSPITAL | Age: 39
LOS: 1 days | Discharge: HOME | End: 2019-09-10

## 2019-09-10 DIAGNOSIS — F10.10 ALCOHOL ABUSE, UNCOMPLICATED: ICD-10-CM

## 2019-09-12 ENCOUNTER — OUTPATIENT (OUTPATIENT)
Dept: OUTPATIENT SERVICES | Facility: HOSPITAL | Age: 39
LOS: 1 days | Discharge: HOME | End: 2019-09-12

## 2019-09-12 DIAGNOSIS — F11.20 OPIOID DEPENDENCE, UNCOMPLICATED: ICD-10-CM

## 2019-09-20 ENCOUNTER — OUTPATIENT (OUTPATIENT)
Dept: OUTPATIENT SERVICES | Facility: HOSPITAL | Age: 39
LOS: 1 days | Discharge: HOME | End: 2019-09-20

## 2019-09-20 DIAGNOSIS — F10.10 ALCOHOL ABUSE, UNCOMPLICATED: ICD-10-CM

## 2019-09-26 ENCOUNTER — OUTPATIENT (OUTPATIENT)
Dept: OUTPATIENT SERVICES | Facility: HOSPITAL | Age: 39
LOS: 1 days | Discharge: HOME | End: 2019-09-26
Payer: MEDICAID

## 2019-09-26 DIAGNOSIS — F10.10 ALCOHOL ABUSE, UNCOMPLICATED: ICD-10-CM

## 2019-09-26 PROCEDURE — 99214 OFFICE O/P EST MOD 30 MIN: CPT

## 2019-10-03 ENCOUNTER — OUTPATIENT (OUTPATIENT)
Dept: OUTPATIENT SERVICES | Facility: HOSPITAL | Age: 39
LOS: 1 days | Discharge: HOME | End: 2019-10-03

## 2019-10-03 DIAGNOSIS — F10.10 ALCOHOL ABUSE, UNCOMPLICATED: ICD-10-CM

## 2019-10-08 ENCOUNTER — OUTPATIENT (OUTPATIENT)
Dept: OUTPATIENT SERVICES | Facility: HOSPITAL | Age: 39
LOS: 1 days | Discharge: HOME | End: 2019-10-08

## 2019-10-08 DIAGNOSIS — F10.10 ALCOHOL ABUSE, UNCOMPLICATED: ICD-10-CM

## 2019-10-15 ENCOUNTER — OUTPATIENT (OUTPATIENT)
Dept: OUTPATIENT SERVICES | Facility: HOSPITAL | Age: 39
LOS: 1 days | Discharge: HOME | End: 2019-10-15

## 2019-10-15 DIAGNOSIS — F10.10 ALCOHOL ABUSE, UNCOMPLICATED: ICD-10-CM

## 2019-10-22 ENCOUNTER — OUTPATIENT (OUTPATIENT)
Dept: OUTPATIENT SERVICES | Facility: HOSPITAL | Age: 39
LOS: 1 days | Discharge: HOME | End: 2019-10-22

## 2019-10-22 DIAGNOSIS — F10.10 ALCOHOL ABUSE, UNCOMPLICATED: ICD-10-CM

## 2019-10-24 ENCOUNTER — OUTPATIENT (OUTPATIENT)
Dept: OUTPATIENT SERVICES | Facility: HOSPITAL | Age: 39
LOS: 1 days | Discharge: HOME | End: 2019-10-24

## 2019-10-24 DIAGNOSIS — F10.10 ALCOHOL ABUSE, UNCOMPLICATED: ICD-10-CM

## 2019-10-31 ENCOUNTER — OUTPATIENT (OUTPATIENT)
Dept: OUTPATIENT SERVICES | Facility: HOSPITAL | Age: 39
LOS: 1 days | Discharge: HOME | End: 2019-10-31

## 2019-10-31 DIAGNOSIS — F10.10 ALCOHOL ABUSE, UNCOMPLICATED: ICD-10-CM

## 2019-11-05 ENCOUNTER — OUTPATIENT (OUTPATIENT)
Dept: OUTPATIENT SERVICES | Facility: HOSPITAL | Age: 39
LOS: 1 days | Discharge: HOME | End: 2019-11-05

## 2019-11-05 DIAGNOSIS — F10.10 ALCOHOL ABUSE, UNCOMPLICATED: ICD-10-CM

## 2019-11-15 ENCOUNTER — OUTPATIENT (OUTPATIENT)
Dept: OUTPATIENT SERVICES | Facility: HOSPITAL | Age: 39
LOS: 1 days | Discharge: HOME | End: 2019-11-15

## 2019-11-15 DIAGNOSIS — F11.20 OPIOID DEPENDENCE, UNCOMPLICATED: ICD-10-CM

## 2019-11-19 ENCOUNTER — OUTPATIENT (OUTPATIENT)
Dept: OUTPATIENT SERVICES | Facility: HOSPITAL | Age: 39
LOS: 1 days | Discharge: HOME | End: 2019-11-19

## 2019-11-19 DIAGNOSIS — F10.10 ALCOHOL ABUSE, UNCOMPLICATED: ICD-10-CM

## 2019-11-21 ENCOUNTER — OUTPATIENT (OUTPATIENT)
Dept: OUTPATIENT SERVICES | Facility: HOSPITAL | Age: 39
LOS: 1 days | Discharge: HOME | End: 2019-11-21

## 2019-11-21 DIAGNOSIS — F10.10 ALCOHOL ABUSE, UNCOMPLICATED: ICD-10-CM

## 2019-11-26 ENCOUNTER — OUTPATIENT (OUTPATIENT)
Dept: OUTPATIENT SERVICES | Facility: HOSPITAL | Age: 39
LOS: 1 days | Discharge: HOME | End: 2019-11-26

## 2019-11-26 DIAGNOSIS — F10.10 ALCOHOL ABUSE, UNCOMPLICATED: ICD-10-CM

## 2019-12-03 ENCOUNTER — OUTPATIENT (OUTPATIENT)
Dept: OUTPATIENT SERVICES | Facility: HOSPITAL | Age: 39
LOS: 1 days | Discharge: HOME | End: 2019-12-03

## 2019-12-03 DIAGNOSIS — F10.10 ALCOHOL ABUSE, UNCOMPLICATED: ICD-10-CM

## 2019-12-05 ENCOUNTER — OUTPATIENT (OUTPATIENT)
Dept: OUTPATIENT SERVICES | Facility: HOSPITAL | Age: 39
LOS: 1 days | Discharge: HOME | End: 2019-12-05

## 2019-12-05 DIAGNOSIS — F10.10 ALCOHOL ABUSE, UNCOMPLICATED: ICD-10-CM

## 2019-12-10 ENCOUNTER — OUTPATIENT (OUTPATIENT)
Dept: OUTPATIENT SERVICES | Facility: HOSPITAL | Age: 39
LOS: 1 days | Discharge: HOME | End: 2019-12-10

## 2019-12-10 DIAGNOSIS — F10.10 ALCOHOL ABUSE, UNCOMPLICATED: ICD-10-CM

## 2019-12-17 ENCOUNTER — OUTPATIENT (OUTPATIENT)
Dept: OUTPATIENT SERVICES | Facility: HOSPITAL | Age: 39
LOS: 1 days | Discharge: HOME | End: 2019-12-17

## 2019-12-17 DIAGNOSIS — F10.10 ALCOHOL ABUSE, UNCOMPLICATED: ICD-10-CM

## 2019-12-19 ENCOUNTER — OUTPATIENT (OUTPATIENT)
Dept: OUTPATIENT SERVICES | Facility: HOSPITAL | Age: 39
LOS: 1 days | Discharge: HOME | End: 2019-12-19

## 2019-12-19 DIAGNOSIS — F10.10 ALCOHOL ABUSE, UNCOMPLICATED: ICD-10-CM

## 2019-12-24 ENCOUNTER — OUTPATIENT (OUTPATIENT)
Dept: OUTPATIENT SERVICES | Facility: HOSPITAL | Age: 39
LOS: 1 days | Discharge: HOME | End: 2019-12-24
Payer: MEDICAID

## 2019-12-24 DIAGNOSIS — F10.10 ALCOHOL ABUSE, UNCOMPLICATED: ICD-10-CM

## 2019-12-24 PROCEDURE — 99214 OFFICE O/P EST MOD 30 MIN: CPT

## 2020-01-02 ENCOUNTER — OUTPATIENT (OUTPATIENT)
Dept: OUTPATIENT SERVICES | Facility: HOSPITAL | Age: 40
LOS: 1 days | Discharge: HOME | End: 2020-01-02

## 2020-01-02 DIAGNOSIS — F10.10 ALCOHOL ABUSE, UNCOMPLICATED: ICD-10-CM

## 2020-01-07 ENCOUNTER — OUTPATIENT (OUTPATIENT)
Dept: OUTPATIENT SERVICES | Facility: HOSPITAL | Age: 40
LOS: 1 days | Discharge: HOME | End: 2020-01-07

## 2020-01-07 DIAGNOSIS — F10.10 ALCOHOL ABUSE, UNCOMPLICATED: ICD-10-CM

## 2020-01-14 ENCOUNTER — OUTPATIENT (OUTPATIENT)
Dept: OUTPATIENT SERVICES | Facility: HOSPITAL | Age: 40
LOS: 1 days | Discharge: HOME | End: 2020-01-14

## 2020-01-14 DIAGNOSIS — F10.10 ALCOHOL ABUSE, UNCOMPLICATED: ICD-10-CM

## 2020-01-16 ENCOUNTER — OUTPATIENT (OUTPATIENT)
Dept: OUTPATIENT SERVICES | Facility: HOSPITAL | Age: 40
LOS: 1 days | Discharge: HOME | End: 2020-01-16

## 2020-01-16 DIAGNOSIS — F10.10 ALCOHOL ABUSE, UNCOMPLICATED: ICD-10-CM

## 2020-01-21 ENCOUNTER — OUTPATIENT (OUTPATIENT)
Dept: OUTPATIENT SERVICES | Facility: HOSPITAL | Age: 40
LOS: 1 days | Discharge: HOME | End: 2020-01-21

## 2020-01-21 DIAGNOSIS — F10.10 ALCOHOL ABUSE, UNCOMPLICATED: ICD-10-CM

## 2020-01-23 ENCOUNTER — OUTPATIENT (OUTPATIENT)
Dept: OUTPATIENT SERVICES | Facility: HOSPITAL | Age: 40
LOS: 1 days | Discharge: HOME | End: 2020-01-23

## 2020-01-23 DIAGNOSIS — F10.10 ALCOHOL ABUSE, UNCOMPLICATED: ICD-10-CM

## 2020-02-06 ENCOUNTER — OUTPATIENT (OUTPATIENT)
Dept: OUTPATIENT SERVICES | Facility: HOSPITAL | Age: 40
LOS: 1 days | Discharge: HOME | End: 2020-02-06

## 2020-02-06 DIAGNOSIS — F10.10 ALCOHOL ABUSE, UNCOMPLICATED: ICD-10-CM

## 2020-02-18 ENCOUNTER — OUTPATIENT (OUTPATIENT)
Dept: OUTPATIENT SERVICES | Facility: HOSPITAL | Age: 40
LOS: 1 days | Discharge: HOME | End: 2020-02-18

## 2020-02-18 DIAGNOSIS — F11.20 OPIOID DEPENDENCE, UNCOMPLICATED: ICD-10-CM

## 2020-02-20 ENCOUNTER — OUTPATIENT (OUTPATIENT)
Dept: OUTPATIENT SERVICES | Facility: HOSPITAL | Age: 40
LOS: 1 days | Discharge: HOME | End: 2020-02-20
Payer: MEDICAID

## 2020-02-20 DIAGNOSIS — F10.10 ALCOHOL ABUSE, UNCOMPLICATED: ICD-10-CM

## 2020-02-20 PROCEDURE — 99214 OFFICE O/P EST MOD 30 MIN: CPT

## 2020-02-27 ENCOUNTER — OUTPATIENT (OUTPATIENT)
Dept: OUTPATIENT SERVICES | Facility: HOSPITAL | Age: 40
LOS: 1 days | Discharge: HOME | End: 2020-02-27

## 2020-02-27 DIAGNOSIS — F10.10 ALCOHOL ABUSE, UNCOMPLICATED: ICD-10-CM

## 2020-03-03 ENCOUNTER — OUTPATIENT (OUTPATIENT)
Dept: OUTPATIENT SERVICES | Facility: HOSPITAL | Age: 40
LOS: 1 days | Discharge: HOME | End: 2020-03-03

## 2020-03-03 DIAGNOSIS — F10.10 ALCOHOL ABUSE, UNCOMPLICATED: ICD-10-CM

## 2020-03-05 ENCOUNTER — OUTPATIENT (OUTPATIENT)
Dept: OUTPATIENT SERVICES | Facility: HOSPITAL | Age: 40
LOS: 1 days | Discharge: HOME | End: 2020-03-05

## 2020-03-05 DIAGNOSIS — F10.10 ALCOHOL ABUSE, UNCOMPLICATED: ICD-10-CM

## 2020-03-26 ENCOUNTER — OUTPATIENT (OUTPATIENT)
Dept: OUTPATIENT SERVICES | Facility: HOSPITAL | Age: 40
LOS: 1 days | Discharge: HOME | End: 2020-03-26

## 2020-03-31 ENCOUNTER — OUTPATIENT (OUTPATIENT)
Dept: OUTPATIENT SERVICES | Facility: HOSPITAL | Age: 40
LOS: 1 days | Discharge: HOME | End: 2020-03-31
Payer: MEDICAID

## 2020-03-31 PROCEDURE — 99442: CPT

## 2020-04-02 ENCOUNTER — OUTPATIENT (OUTPATIENT)
Dept: OUTPATIENT SERVICES | Facility: HOSPITAL | Age: 40
LOS: 1 days | Discharge: HOME | End: 2020-04-02

## 2020-04-07 DIAGNOSIS — F11.20 OPIOID DEPENDENCE, UNCOMPLICATED: ICD-10-CM

## 2020-04-08 ENCOUNTER — OUTPATIENT (OUTPATIENT)
Dept: OUTPATIENT SERVICES | Facility: HOSPITAL | Age: 40
LOS: 1 days | Discharge: HOME | End: 2020-04-08

## 2020-04-09 DIAGNOSIS — F11.20 OPIOID DEPENDENCE, UNCOMPLICATED: ICD-10-CM

## 2020-04-28 ENCOUNTER — OUTPATIENT (OUTPATIENT)
Dept: OUTPATIENT SERVICES | Facility: HOSPITAL | Age: 40
LOS: 1 days | Discharge: HOME | End: 2020-04-28

## 2020-04-28 DIAGNOSIS — F10.10 ALCOHOL ABUSE, UNCOMPLICATED: ICD-10-CM

## 2020-04-29 ENCOUNTER — OUTPATIENT (OUTPATIENT)
Dept: OUTPATIENT SERVICES | Facility: HOSPITAL | Age: 40
LOS: 1 days | Discharge: HOME | End: 2020-04-29
Payer: MEDICAID

## 2020-04-29 DIAGNOSIS — F10.10 ALCOHOL ABUSE, UNCOMPLICATED: ICD-10-CM

## 2020-04-29 PROCEDURE — 99443: CPT

## 2020-05-05 ENCOUNTER — OUTPATIENT (OUTPATIENT)
Dept: OUTPATIENT SERVICES | Facility: HOSPITAL | Age: 40
LOS: 1 days | Discharge: HOME | End: 2020-05-05

## 2020-05-05 DIAGNOSIS — F10.10 ALCOHOL ABUSE, UNCOMPLICATED: ICD-10-CM

## 2020-05-22 ENCOUNTER — OUTPATIENT (OUTPATIENT)
Dept: OUTPATIENT SERVICES | Facility: HOSPITAL | Age: 40
LOS: 1 days | Discharge: HOME | End: 2020-05-22

## 2020-05-22 DIAGNOSIS — F11.20 OPIOID DEPENDENCE, UNCOMPLICATED: ICD-10-CM

## 2020-06-03 ENCOUNTER — OUTPATIENT (OUTPATIENT)
Dept: OUTPATIENT SERVICES | Facility: HOSPITAL | Age: 40
LOS: 1 days | Discharge: HOME | End: 2020-06-03

## 2020-06-03 DIAGNOSIS — F10.10 ALCOHOL ABUSE, UNCOMPLICATED: ICD-10-CM

## 2020-06-04 ENCOUNTER — OUTPATIENT (OUTPATIENT)
Dept: OUTPATIENT SERVICES | Facility: HOSPITAL | Age: 40
LOS: 1 days | Discharge: HOME | End: 2020-06-04

## 2020-07-06 ENCOUNTER — OUTPATIENT (OUTPATIENT)
Dept: OUTPATIENT SERVICES | Facility: HOSPITAL | Age: 40
LOS: 1 days | Discharge: HOME | End: 2020-07-06

## 2020-07-06 DIAGNOSIS — F11.20 OPIOID DEPENDENCE, UNCOMPLICATED: ICD-10-CM

## 2020-07-08 ENCOUNTER — OUTPATIENT (OUTPATIENT)
Dept: OUTPATIENT SERVICES | Facility: HOSPITAL | Age: 40
LOS: 1 days | Discharge: HOME | End: 2020-07-08
Payer: MEDICAID

## 2020-07-08 DIAGNOSIS — F10.10 ALCOHOL ABUSE, UNCOMPLICATED: ICD-10-CM

## 2020-07-08 PROCEDURE — ZZZZZ: CPT

## 2021-09-13 NOTE — ED ADULT NURSE NOTE - NS PRO PASSIVE SMOKE EXP
No
Quality 226: Preventive Care And Screening: Tobacco Use: Screening And Cessation Intervention: Patient screened for tobacco use and is an ex/non-smoker
Quality 431: Preventive Care And Screening: Unhealthy Alcohol Use - Screening: Patient screened for unhealthy alcohol use using a single question and scores less than 2 times per year
Detail Level: Detailed
Quality 431: Preventive Care And Screening: Unhealthy Alcohol Use - Screening: Patient not identified as an unhealthy alcohol user when screened for unhealthy alcohol use using a systematic screening method
Quality 130: Documentation Of Current Medications In The Medical Record: Current Medications Documented
Quality 110: Preventive Care And Screening: Influenza Immunization: Influenza Immunization previously received during influenza season

## 2022-11-30 ENCOUNTER — EMERGENCY (EMERGENCY)
Facility: HOSPITAL | Age: 42
LOS: 0 days | Discharge: HOME | End: 2022-11-30
Attending: EMERGENCY MEDICINE | Admitting: EMERGENCY MEDICINE

## 2022-11-30 VITALS
OXYGEN SATURATION: 100 % | TEMPERATURE: 97 F | RESPIRATION RATE: 20 BRPM | SYSTOLIC BLOOD PRESSURE: 177 MMHG | WEIGHT: 175.05 LBS | DIASTOLIC BLOOD PRESSURE: 101 MMHG | HEART RATE: 96 BPM

## 2022-11-30 DIAGNOSIS — Z00.00 ENCOUNTER FOR GENERAL ADULT MEDICAL EXAMINATION WITHOUT ABNORMAL FINDINGS: ICD-10-CM

## 2022-11-30 DIAGNOSIS — F32.A DEPRESSION, UNSPECIFIED: ICD-10-CM

## 2022-11-30 DIAGNOSIS — Z88.2 ALLERGY STATUS TO SULFONAMIDES: ICD-10-CM

## 2022-11-30 DIAGNOSIS — R46.89 OTHER SYMPTOMS AND SIGNS INVOLVING APPEARANCE AND BEHAVIOR: ICD-10-CM

## 2022-11-30 PROCEDURE — 99282 EMERGENCY DEPT VISIT SF MDM: CPT

## 2022-11-30 RX ORDER — ATOMOXETINE HYDROCHLORIDE 10 MG/1
0 CAPSULE ORAL
Qty: 0 | Refills: 0 | DISCHARGE

## 2022-11-30 RX ORDER — BUPRENORPHINE AND NALOXONE 2; .5 MG/1; MG/1
0 TABLET SUBLINGUAL
Qty: 0 | Refills: 0 | DISCHARGE

## 2022-11-30 RX ORDER — BUPROPION HYDROCHLORIDE 150 MG/1
1 TABLET, EXTENDED RELEASE ORAL
Qty: 0 | Refills: 0 | DISCHARGE

## 2022-11-30 RX ORDER — CHLORDIAZEPOXIDE/CLIDINIUM BR 5 MG-2.5MG
0 CAPSULE ORAL
Qty: 0 | Refills: 0 | DISCHARGE

## 2022-11-30 NOTE — ED PROVIDER NOTE - NS ED ATTENDING STATEMENT MOD
This was a shared visit with the BOB. I reviewed and verified the documentation and independently performed the documented:
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
## 2022-11-30 NOTE — ED PROVIDER NOTE - NS ED ROS FT
Review of Systems:  	•	CONSTITUTIONAL - no fever, no diaphoresis, no chills    	•	RESPIRATORY - no shortness of breath, no cough  	•	CARDIAC - no chest pain, no palpitations    	•	NEUROLOGIC - no weakness, no headache, no paresthesias, no LOC  	•	PSYCH - no anxiety, non suicidal, non homicidal, no hallucination, no depression
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
## 2022-11-30 NOTE — ED PROVIDER NOTE - PATIENT PORTAL LINK FT
You can access the FollowMyHealth Patient Portal offered by [facility] by registering at the following website: [email or web address]. By joining [employer]’s FollowMyHealth portal, you will also be able to view your health information using other applications (apps) compatible with our system.
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
## 2022-12-01 ENCOUNTER — EMERGENCY (EMERGENCY)
Facility: HOSPITAL | Age: 42
LOS: 0 days | Discharge: LEFT BEFORE DISCHARGE | End: 2022-12-01
Attending: EMERGENCY MEDICINE | Admitting: EMERGENCY MEDICINE

## 2022-12-01 VITALS
TEMPERATURE: 98 F | SYSTOLIC BLOOD PRESSURE: 132 MMHG | OXYGEN SATURATION: 98 % | RESPIRATION RATE: 16 BRPM | HEART RATE: 84 BPM | DIASTOLIC BLOOD PRESSURE: 76 MMHG | WEIGHT: 169.98 LBS | HEIGHT: 68 IN

## 2022-12-01 DIAGNOSIS — R10.9 UNSPECIFIED ABDOMINAL PAIN: ICD-10-CM

## 2022-12-01 DIAGNOSIS — Z53.21 PROCEDURE AND TREATMENT NOT CARRIED OUT DUE TO PATIENT LEAVING PRIOR TO BEING SEEN BY HEALTH CARE PROVIDER: ICD-10-CM

## 2022-12-01 PROCEDURE — L9991: CPT

## 2022-12-01 NOTE — ED ADULT TRIAGE NOTE - PAIN RATING/NUMBER SCALE (0-10): ACTIVITY
Chief Complaint   Patient presents with    Other     went to ER monday after feeling itchy and having a cough, concerned about asthma or allergic reaction      Visit Vitals  /71   Pulse 74   Temp 97.6 °F (36.4 °C) (Oral)   Resp 18   Ht 5' 3.11\" (1.603 m)   Wt 119 lb 6 oz (54.1 kg)   SpO2 99%   BMI 21.07 kg/m²     1. Have you been to the ER, urgent care clinic since your last visit? Hospitalized since your last visit? yes er monday    2. Have you seen or consulted any other health care providers outside of the [address] [place] [address] since your last visit? Include any pap smears or colon screening.  no 2

## 2022-12-14 NOTE — DISCHARGE NOTE ADULT - MEDICATION SUMMARY - MEDICATIONS TO CHANGE
I will SWITCH the dose or number of times a day I take the medications listed below when I get home from the hospital:  None
14-Dec-2022 18:52

## 2023-01-27 ENCOUNTER — EMERGENCY (EMERGENCY)
Facility: HOSPITAL | Age: 43
LOS: 0 days | Discharge: HOME | End: 2023-01-27
Attending: EMERGENCY MEDICINE | Admitting: EMERGENCY MEDICINE
Payer: MEDICAID

## 2023-01-27 VITALS
HEART RATE: 80 BPM | HEIGHT: 68 IN | RESPIRATION RATE: 20 BRPM | OXYGEN SATURATION: 100 % | DIASTOLIC BLOOD PRESSURE: 79 MMHG | TEMPERATURE: 97 F | SYSTOLIC BLOOD PRESSURE: 126 MMHG

## 2023-01-27 DIAGNOSIS — F17.200 NICOTINE DEPENDENCE, UNSPECIFIED, UNCOMPLICATED: ICD-10-CM

## 2023-01-27 DIAGNOSIS — K21.9 GASTRO-ESOPHAGEAL REFLUX DISEASE WITHOUT ESOPHAGITIS: ICD-10-CM

## 2023-01-27 DIAGNOSIS — Z72.89 OTHER PROBLEMS RELATED TO LIFESTYLE: ICD-10-CM

## 2023-01-27 DIAGNOSIS — R45.851 SUICIDAL IDEATIONS: ICD-10-CM

## 2023-01-27 DIAGNOSIS — F43.23 ADJUSTMENT DISORDER WITH MIXED ANXIETY AND DEPRESSED MOOD: ICD-10-CM

## 2023-01-27 DIAGNOSIS — E11.9 TYPE 2 DIABETES MELLITUS WITHOUT COMPLICATIONS: ICD-10-CM

## 2023-01-27 DIAGNOSIS — F32.9 MAJOR DEPRESSIVE DISORDER, SINGLE EPISODE, UNSPECIFIED: ICD-10-CM

## 2023-01-27 DIAGNOSIS — F41.9 ANXIETY DISORDER, UNSPECIFIED: ICD-10-CM

## 2023-01-27 DIAGNOSIS — Z88.2 ALLERGY STATUS TO SULFONAMIDES: ICD-10-CM

## 2023-01-27 DIAGNOSIS — F90.9 ATTENTION-DEFICIT HYPERACTIVITY DISORDER, UNSPECIFIED TYPE: ICD-10-CM

## 2023-01-27 DIAGNOSIS — Z20.822 CONTACT WITH AND (SUSPECTED) EXPOSURE TO COVID-19: ICD-10-CM

## 2023-01-27 LAB
ANION GAP SERPL CALC-SCNC: 10 MMOL/L — SIGNIFICANT CHANGE UP (ref 7–14)
APAP SERPL-MCNC: <5 UG/ML — LOW (ref 10–30)
APPEARANCE UR: CLEAR — SIGNIFICANT CHANGE UP
BACTERIA # UR AUTO: SIGNIFICANT CHANGE UP /HPF
BASOPHILS # BLD AUTO: 0.03 K/UL — SIGNIFICANT CHANGE UP (ref 0–0.2)
BASOPHILS NFR BLD AUTO: 0.4 % — SIGNIFICANT CHANGE UP (ref 0–1)
BILIRUB UR-MCNC: NEGATIVE — SIGNIFICANT CHANGE UP
BUN SERPL-MCNC: 15 MG/DL — SIGNIFICANT CHANGE UP (ref 10–20)
CALCIUM SERPL-MCNC: 9.5 MG/DL — SIGNIFICANT CHANGE UP (ref 8.4–10.5)
CHLORIDE SERPL-SCNC: 104 MMOL/L — SIGNIFICANT CHANGE UP (ref 98–110)
CO2 SERPL-SCNC: 25 MMOL/L — SIGNIFICANT CHANGE UP (ref 17–32)
COLOR SPEC: YELLOW — SIGNIFICANT CHANGE UP
CREAT SERPL-MCNC: 1 MG/DL — SIGNIFICANT CHANGE UP (ref 0.7–1.5)
DIFF PNL FLD: ABNORMAL
EGFR: 71 ML/MIN/1.73M2 — SIGNIFICANT CHANGE UP
EOSINOPHIL # BLD AUTO: 0.17 K/UL — SIGNIFICANT CHANGE UP (ref 0–0.7)
EOSINOPHIL NFR BLD AUTO: 2.1 % — SIGNIFICANT CHANGE UP (ref 0–8)
EPI CELLS # UR: ABNORMAL /HPF
ETHANOL SERPL-MCNC: 66 MG/DL — HIGH
GLUCOSE SERPL-MCNC: 86 MG/DL — SIGNIFICANT CHANGE UP (ref 70–99)
GLUCOSE UR QL: NEGATIVE MG/DL — SIGNIFICANT CHANGE UP
HCT VFR BLD CALC: 41.3 % — SIGNIFICANT CHANGE UP (ref 37–47)
HGB BLD-MCNC: 13.9 G/DL — SIGNIFICANT CHANGE UP (ref 12–16)
IMM GRANULOCYTES NFR BLD AUTO: 0.1 % — SIGNIFICANT CHANGE UP (ref 0.1–0.3)
KETONES UR-MCNC: NEGATIVE — SIGNIFICANT CHANGE UP
LEUKOCYTE ESTERASE UR-ACNC: NEGATIVE — SIGNIFICANT CHANGE UP
LYMPHOCYTES # BLD AUTO: 1.74 K/UL — SIGNIFICANT CHANGE UP (ref 1.2–3.4)
LYMPHOCYTES # BLD AUTO: 21.3 % — SIGNIFICANT CHANGE UP (ref 20.5–51.1)
MCHC RBC-ENTMCNC: 30.7 PG — SIGNIFICANT CHANGE UP (ref 27–31)
MCHC RBC-ENTMCNC: 33.7 G/DL — SIGNIFICANT CHANGE UP (ref 32–37)
MCV RBC AUTO: 91.2 FL — SIGNIFICANT CHANGE UP (ref 81–99)
MONOCYTES # BLD AUTO: 0.33 K/UL — SIGNIFICANT CHANGE UP (ref 0.1–0.6)
MONOCYTES NFR BLD AUTO: 4 % — SIGNIFICANT CHANGE UP (ref 1.7–9.3)
NEUTROPHILS # BLD AUTO: 5.88 K/UL — SIGNIFICANT CHANGE UP (ref 1.4–6.5)
NEUTROPHILS NFR BLD AUTO: 72.1 % — SIGNIFICANT CHANGE UP (ref 42.2–75.2)
NITRITE UR-MCNC: NEGATIVE — SIGNIFICANT CHANGE UP
NRBC # BLD: 0 /100 WBCS — SIGNIFICANT CHANGE UP (ref 0–0)
PH UR: 6.5 — SIGNIFICANT CHANGE UP (ref 5–8)
PLATELET # BLD AUTO: 341 K/UL — SIGNIFICANT CHANGE UP (ref 130–400)
POTASSIUM SERPL-MCNC: 5 MMOL/L — SIGNIFICANT CHANGE UP (ref 3.5–5)
POTASSIUM SERPL-SCNC: 5 MMOL/L — SIGNIFICANT CHANGE UP (ref 3.5–5)
PROT UR-MCNC: NEGATIVE MG/DL — SIGNIFICANT CHANGE UP
RBC # BLD: 4.53 M/UL — SIGNIFICANT CHANGE UP (ref 4.2–5.4)
RBC # FLD: 11.2 % — LOW (ref 11.5–14.5)
RBC CASTS # UR COMP ASSIST: ABNORMAL /HPF
SALICYLATES SERPL-MCNC: <0.3 MG/DL — LOW (ref 4–30)
SARS-COV-2 RNA SPEC QL NAA+PROBE: SIGNIFICANT CHANGE UP
SODIUM SERPL-SCNC: 139 MMOL/L — SIGNIFICANT CHANGE UP (ref 135–146)
SP GR SPEC: 1.01 — SIGNIFICANT CHANGE UP (ref 1.01–1.03)
UROBILINOGEN FLD QL: 0.2 MG/DL — SIGNIFICANT CHANGE UP
WBC # BLD: 8.16 K/UL — SIGNIFICANT CHANGE UP (ref 4.8–10.8)
WBC # FLD AUTO: 8.16 K/UL — SIGNIFICANT CHANGE UP (ref 4.8–10.8)
WBC UR QL: SIGNIFICANT CHANGE UP /HPF

## 2023-01-27 PROCEDURE — 99285 EMERGENCY DEPT VISIT HI MDM: CPT

## 2023-01-27 PROCEDURE — 93010 ELECTROCARDIOGRAM REPORT: CPT

## 2023-01-27 PROCEDURE — 90792 PSYCH DIAG EVAL W/MED SRVCS: CPT | Mod: 1L,95,GC
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
## 2023-01-27 NOTE — ED PROVIDER NOTE - PHYSICAL EXAMINATION
GENERAL: Well-nourished, Well-developed. NAD.  HEAD: No visible or palpable bumps or hematomas. No ecchymosis behind ears B/L.  Eyes: PERRLA  CVS: Normal S1,S2. No murmurs appreciated on auscultation   RESP: No use of accessory muscles. Chest rise symmetrical with good expansion. Lungs clear to auscultation B/L. No wheezing, rales, or rhonchi auscultated.  Skin: Warm, Dry. No rashes or lesions. Good cap refill < 2 sec B/L.  Neuro: AA&O x 3. Speaking in full sentences. No slurring of speech. Gait within normal limits.   Psych:  Appropriate mood and affect. Cooperative. Calm

## 2023-01-27 NOTE — ED ADULT NURSE REASSESSMENT NOTE - NS ED NURSE REASSESS COMMENT FT1
Patient brought back to ED with EMS and PD at 0530. Patient undressed and placed on 1:1. 1:1 and PD at bedside.

## 2023-01-27 NOTE — ED BEHAVIORAL HEALTH ASSESSMENT NOTE - HPI (INCLUDE ILLNESS QUALITY, SEVERITY, DURATION, TIMING, CONTEXT, MODIFYING FACTORS, ASSOCIATED SIGNS AND SYMPTOMS)
41yo unemployed WW with h/o substance use d/o, depression/anxiety, currently in Tx with __ BIBA to ED after an argument with her BF     in progress 43yo F, domiciled with boyfriend of 7 years and his 18 yo daughter, unemployed for past 6 months, used to work as unit clerk at [employer], with PPH of NICANOR, MDD, ADHD, AUD, polysubstance use d/o, denies h/o SAs, self-mutilation or IPP admissions, recently in Tx with Dr. Gan and therapist Veronica Alvarez at [facility] (dual focus), PMH of GERD and T2D, who was BIBA (activated by boyfriend) to ED after an argument with her BF wherein pt expressed SI and brought a knife to her throat.  Patient provided both urine specimen and routine blood work and had a BAL of 66. Pt then eloped from hospital but pt's boyfriend and [facility] showed staff a video of her holding a knife to her throat. Upon being brought back by PD, she was apologetic for leaving and cooperative. Of note, pt was seen in the ED thrice in the past for similar ED presentations of emotional disturbance following altercations with her boyfriend (upon chart review, in 12/2022 for a stomach ache after arguing with boyfriend, in 11/2022 after throwing things and voicing SI, and in 12/2019 after voicing SI wherein pt was seen by psychiatry, dx'ed with adjustment d/o and discharged back to OP provider). Psychiatry was consulted for suicidal ideation.     Upon approach, pt appears calm, cooperative and is AAOx4. Speaks rapidly, though is easily interrupted, and is linear; she also appears restless, and notes that she "is usually like this when she is off her ADHD medications." States that she "did something stupid" and upon further questioning reports that she threatened to kill herself after arguing with her boyfriend. She admits that she put a knife to her throat but immediately put the knife down on the table when she saw her boyfriend's 18 yo daughter coming downstairs. She states that she did not want to truly kill herself but wanted to get his attention and make him stop making derogatory remarks towards her; she explains that they were "having a good time", drinking a pint of vodka each and got into an argument for which she could not recall the cause. Pt has never truly wanted to end her life but has made similar statements in the past due to being unable to properly communicate her emotions. She endorses guilt and shame over her actions. She states that for the past year she has been feeling anxious and depressed, but she has not seen her psychiatrist or therapist in 2 months due to lack of motivation and skepticism towards "a pill curing all her problems", particularly after her father passed away last year. She states that she relapsed on her drinking then- after being sober for 6 months following residential rehab at [facility]- and subsequently lost custody of her daughters (the 2 children are in sister's custody following pt failing a urine drug test; per pt, ex-partner at the time (children's father) had reported her in an attempt to reconcile with her). On further questioning, she denies Sx of kennedy, paranoia, or perceptual disturbances. She endorses adequate sleep and appetite, as well as maintenance of ADLs. Pt acknowledges that she has been self-medicating her depression and anxiety with alcohol and recognizes that it is a problem. She voices determination to get back into her OP services at [facility]. She states that she would like to go back to work once her mood Sx are being managed again.     Collateral could not be obtained from pt's OP providers or boyfriend (VM was left), however the pt's report of missed appointments and last prescriptions were completely corroborated though Help Desk staff at [facility]. Pt denies current suicidal ideation, intent or plan and was able to contract for safety. 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 Gan and therapist Veronica Alvarez at [facility] (dual focus), PMH of GERD and T2D, who was BIBA (activated by boyfriend) to ED after an argument with her BF wherein pt expressed SI and brought a knife to her throat.  Patient provided both urine specimen and routine blood work and had a BAL of 66. Pt then eloped from hospital but pt's boyfriend and [facility] showed staff a video of her holding a knife to her throat. Upon being brought back by PD, she was apologetic for leaving and cooperative. Of note, pt was seen in the ED thrice in the past for similar ED presentations of emotional disturbance following altercations with her boyfriend (upon chart review, in 12/2022 for a stomach ache after arguing with boyfriend, in 11/2022 after throwing things and voicing SI, and in 12/2019 after voicing SI wherein pt was seen by psychiatry, dx'ed with adjustment d/o and discharged back to OP provider). Psychiatry was consulted for suicidal ideation.     Upon approach, pt appears calm, cooperative and is AAOx4. Speaks rapidly, though is easily interrupted, and is linear; she also appears restless, and notes that she "is usually like this when she is off her ADHD medications." States that she "did something stupid" and upon further questioning reports that she threatened to kill herself after arguing with her boyfriend. She admits that she put a knife to her throat but immediately put the knife down on the table when she saw her boyfriend's 20 yo daughter coming downstairs. She states that she did not want to truly kill herself but wanted to get his attention and make him stop making derogatory remarks towards her; she explains that they were "having a good time", drinking a pint of vodka each and got into an argument for which she could not recall the cause. Pt has never truly wanted to end her life but has made similar statements in the past due to being unable to properly communicate her emotions. She endorses guilt and shame over her actions. She states that for the past year she has been feeling anxious and depressed, but she has not seen her psychiatrist or therapist in 2 months due to lack of motivation and skepticism towards "a pill curing all her problems", particularly after her father passed away last year. She states that she relapsed on her drinking then- after being sober for 6 months following residential rehab at [facility]- and subsequently lost custody of her daughters (the 2 children are in sister's custody following pt failing a urine drug test; per pt, ex-partner at the time (children's father) had reported her in an attempt to reconcile with her). On further questioning, she denies Sx of kennedy, paranoia, or perceptual disturbances. She endorses adequate sleep and appetite, as well as maintenance of ADLs. Pt acknowledges that she has been self-medicating her depression and anxiety with alcohol and recognizes that it is a problem. She voices determination to get back into her OP services at [facility]. She states that she would like to go back to work once her mood Sx are being managed again.     Collateral could not be obtained from pt's OP providers or boyfriend (VM was left), however the pt's report of missed appointments and last prescriptions were completely corroborated though Help Desk staff at [facility] and [facility]. Pt denies current suicidal ideation, intent or plan and was able to contract for safety.    [facility] Reference #: [id number]  Others' Prescriptions  Patient Name: Tova CernaBirth Date: 1980  Address: [address] [place] [id number]: Female  Rx Written	Rx Dispensed	Drug	Quantity	Days Supply	Prescriber Name	Prescriber Aspen #	Payment Method	Dispenser  10/07/2022	10/26/2022	dextroamp-amphet er 10 mg cap	30	30	Celestino Gan	[id number]	Jain	[facility] #[id number]  10/07/2022	10/18/2022	vyvanse 50 mg capsule	30	30	Celestino Gan	[id number]	Medicaid	[facility][id number]  09/08/2022	09/26/2022	dextroamp-amphet er 10 mg cap	30	30	Bhalodi, Celestino	[id number]	[facility] #[id number]  09/08/2022	09/09/2022	vyvanse 50 mg capsule	30	30	Bhalodi, Celestino	[id number]	Medicaid	[facility][id number]  07/11/2022	07/12/2022	vyvanse 50 mg capsule	30	30	Bhalodi, Celestino	[id number]	Medicaid	[facility] #[id number]  07/11/2022	07/12/2022	dextroamp-amphet er 10 mg cap	30	30	Bhalodi, Celestino	[id number]	[facility] #[id number]  06/09/2022	06/09/2022	dextroamp-amphet er 10 mg cap	30	30	Bhalodi, Celestino	[id number]	[facility] #[id number]  06/08/2022	06/08/2022	buprenorphine-naloxone 2-0.5 mg sl tablet	9	3	Ciarra Jolley MD	[id number]	Medicaid	[facility][id number]  06/02/2022	06/04/2022	buprenorphine-naloxone 2-0.5 mg sl tablet	3	1	Ciarra Jolley MD	[id number]	Medicaid	[facility][id number]  04/27/2022	05/01/2022	vyvanse 50 mg capsule	30	30	April Johnson	[id number]	Medicaid	[facility] #[id number]  04/27/2022	05/01/2022	dextroamp-amphet er 10 mg cap	30	30	April Johnson	[id number]	[facility] #[id number]  04/27/2022	05/01/2022	buprenorphine-naloxone 2-0.5 mg sl tablet	21	7	Ciarra Jolley MD	[id number]	Medicaid	[facility] #[id number]  04/15/2022	04/19/2022	buprenorphine-naloxone 2-0.5 mg sl tablet	9	3	Ciarra Jolley MD	[id number]	Medicaid	[facility] #[id number]  03/28/2022	04/13/2022	dextroamp-amphet er 10 mg cap	10	10	April Johnson	[id number]	[facility] #[id number]  03/30/2022	04/04/2022	buprenorphine-naloxone 2-0.5 mg sl tablet	21	7	Ciarra Jolley MD	[id number]	Medicaid	[facility] #[id number]  03/28/2022	03/28/2022	vyvanse 50 mg capsule	20	20	April Johnson	[id number]	Medicaid	[facility] #[id number]  03/11/2022	03/11/2022	vyvanse 70 mg capsule	10	10	April Johnson	[id number]	Medicaid	[facility] #[id number]  03/09/2022	03/09/2022	buprenorphine-naloxone 2-0.5 mg sl tablet	21	7	Ciarra Jolley MD	[id number]	Medicaid	[facility][id number]  02/24/2022	03/01/2022	dextroamp-amphet er 5 mg cap	30	30	Arpil Johnson	[id number]	[facility] #[id number]  02/24/2022	02/25/2022	vyvanse 70 mg capsule	14	14	April Johnson	[id number]	Medicaid	[facility][id number]  02/09/2022	02/18/2022	vyvanse 70 mg capsule	6	6	Celestino Gan	[id number]	Medicaid	[facility] #[id number]  02/18/2022	02/18/2022	buprenorphine-naloxone 2-0.5 mg sl tablet	42	14	Ciarra Jolley MD	[id number]	Medicaid	[facility] #[id number]  02/09/2022	02/14/2022	buprenorphine-naloxone 2-0.5 mg sl tablet	21	7	Ciarra Jolley MD	[id number]	Medicaid	[facility][id number]  01/28/2022	02/03/2022	vyvanse 70 mg capsule	10	10	April Johnson	[id number]	Medicaid	[facility] #[id number]  02/01/2022	02/03/2022	buprenorphine-naloxone 2-0.5 mg sl tablet	21	7	Ciarra Jolley MD	[id number]	Medicaid	[facility][id number]

## 2023-01-27 NOTE — ED BEHAVIORAL HEALTH ASSESSMENT NOTE - CURRENT MEDICATION
Last prescribed Vyvanse 50 mg, Suboxone 2/0.5 TID in 10/2022 however pt states she has not followed up with OP psychiatrist in 2 months and has not been taking medications since.

## 2023-01-27 NOTE — ED PROVIDER NOTE - CLINICAL SUMMARY MEDICAL DECISION MAKING FREE TEXT BOX
Patient is medically cleared for psychiatric examination.  Following telemetry psychiatric examination, patient was felt to be stable for discharge.

## 2023-01-27 NOTE — ED BEHAVIORAL HEALTH NOTE - BEHAVIORAL HEALTH NOTE
===================   PRE-HOSPITAL COURSE   ===================     SOURCE: Chart review      DETAILS: Pt. Intoxicated and after an argument with boyfriend he called 911.      ============   ED COURSE   ============     SOURCE: Chart review      ARRIVAL: Ambulance      BELONGINGS: Nothing of note in pt’s belongings     BEHAVIOR: Pt was compliant with good cooperation for entire process of wanding/search/ and gowning and was escorted to the  area with no issues. Patient provided both urine specimen and routine blood work willingly. Pt came in with a BAL of 66. Pt also eloped and went home and was brought back by PD. Pt was very apologetic and cooperative for leaving.  Hygiene is fair, pt. denies SI/HI, however she had told her boyfriend she was going to slit her throat and now reports it was in the moment due to her frustration and not true attempt. Pt’s affect is euthymic, speech is at a normal rate, clear, thoughts are linear/logical, eye contact is good, denies AVH/delusions, no aggression or behavioral issues and is AOX4.      TREATMENT: No medications currently required      VISITORS: PD at bedside but currently no friends or family at bedside

## 2023-01-27 NOTE — ED BEHAVIORAL HEALTH ASSESSMENT NOTE - NSBHATTESTCOMMENTATTENDFT_PSY_A_CORE
Patient seen and examined on this morning. Chart reviewed. Case discussed with EM provider. Patient says "I feel a lot better" this morning. Patient this morning denies any suicidal ideation, intent or plan. Says "I was looking for attention". Patient's presentation is consistent with multiple similar presentations and these behaviors appear chronic in nature. Additionally, patient was drinking alcohol at the time of the event and says "I wasn't sober. I would never want to end my life." Now that patient is sober she is able to readily engage in safety planning. Patient is not endorsing any acutely dangerous psychiatric symptoms or any symptoms consistent with an acute mood or psychotic disorder that would be amenable to inpatient treatment. Patient is also not exhibiting any acutely dangerous behaviors. Patient does not meet criteria for involuntary psychiatric hospitalization. Agree with assessment and plan as detailed by Dr. Ahuja.

## 2023-01-27 NOTE — ED BEHAVIORAL HEALTH ASSESSMENT NOTE - VIOLENCE RISK FACTORS:
Affective dysregulation/Impulsivity/Noncompliance with treatment/Community stressors that increase the risk of destabilization

## 2023-01-27 NOTE — ED BEHAVIORAL HEALTH ASSESSMENT NOTE - OTHER
Boyfriend and his 19 year old daughter Pt's boyfriend restlessness rapid speech (easily interrupted), likely 2/2 to hyperactivity associated with ADHD; pt also endorses that she speaks rapidly at her baseline Deferred to ED

## 2023-01-27 NOTE — ED PROVIDER NOTE - DIFFERENTIAL DIAGNOSIS
substance induced mood disorder, adjustment mood disorder, decompensated anxiety/depression Differential Diagnosis

## 2023-01-27 NOTE — ED ADULT NURSE REASSESSMENT NOTE - NS ED NURSE REASSESS COMMENT FT1
Code flight called for pt who walked out of the ED while preparing to get pt unchanged and blood drawn, pt was very cooperative and states " I know that you have to do what you have to do, I don't want to hurt myself, I was drunk and saying stupid shit when I'm drunk". Pt was on Hall4 in front of nursing station, pt walked through AC area and walked out.

## 2023-01-27 NOTE — ED PROVIDER NOTE - PROGRESS NOTE DETAILS
TC: After collateral obtained from boyfriend and boyfriend's daughter, we returned to pt to inform her that she needed psych eval. Pt said she understood because of what she had said and was cooperative with allowing covid swab. Staff was informed that pt would be placed on 1:1 for SI and would require psych labs and psych eval. Before 1:1 was able to arrive, pt walked out of the ED through the back entrance (avoiding front nursing desk where staff was all located) and was seen walking out of the waiting room. We were immediately notified by  who asked if pt was discharged because she saw pt walk out through the front. Code white activated. , myself, and  immediately ran outside to look for pt. We searched up and down several streets but could not find pt. Pt did not arrive by private vehicle so could have only left on foot but was unable to be located. [facility] notified that pt had escaped and they will search for pt. TC: After collateral obtained from boyfriend and boyfriend's daughter, we returned to pt to inform her that she needed psych eval. Pt said she understood because of what she had said and was cooperative with allowing covid swab. Staff was informed that pt would be placed on 1:1 for SI and would require psych labs and psych eval. Before 1:1 was able to arrive, pt walked out of the ED through the back entrance (avoiding front nursing desk where staff was all located) and was seen walking out of the waiting room. We were immediately notified by  who asked if pt was discharged because she saw pt walk out through the front. Code white activated. , myself, and  immediately ran outside to look for pt. We searched up and down several streets but could not find pt. Pt did not arrive by private vehicle so could have only left on foot but was still unable to be located in the near vicinity. [facility] notified that pt had escaped and they will search for pt. TC: Pt brought back to ED by [facility]. They say they found her at home as her boyfriend was informed to call [facility] if pt showed up. Pt admits to walking all the way home and apologized for eloping. Security at bedside. Patient/EMS TC: Labs notable for etoh 60, rest of labs wnl. Covid negative. Telepsych consulted, will see pt. Pt signed out to day team to f/u telepsych recs. pt cleared by psych, will dc

## 2023-01-27 NOTE — ED ADULT TRIAGE NOTE - CHIEF COMPLAINT QUOTE
BIBA. As per EMS patient intoxicated, had an argument with boyfriend. Boyfriend called 911. Denies SI/HI.

## 2023-01-27 NOTE — ED BEHAVIORAL HEALTH ASSESSMENT NOTE - DETAILS
10 yo and 12 yo daughters father and maternal grandmother - alcohol use d/o, daughter with autism. Pt's boyfriend could not be reached. VM left. See HPI 2 children are in sister's custody following pt failing a urine drug test while children were in her custody; per pt, ex-partner at the time (children's father) had reported her in an attempt to reconcile with her. see HER

## 2023-01-27 NOTE — ED BEHAVIORAL HEALTH ASSESSMENT NOTE - DESCRIPTION
domestic partner, 2 children who are in sister's custody, lives with bf and bf's 20 yo daughter GERD, type 2 DM Patient provided both urine specimen and routine blood work and had a BAL of 66. Pt then eloped from hospital but pt's boyfriend and [employer] showed staff a video of her holding a knife to her throat. Upon being brought back by PD, she was apologetic for leaving and cooperative. On CO.

## 2023-01-27 NOTE — ED BEHAVIORAL HEALTH ASSESSMENT NOTE - SUMMARY
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 Gan and therapist Veronica Alvarez at [facility] (dual focus), PMH of GERD and T2D, who was BIBA (activated by boyfriend) to ED after an argument with her BF wherein pt expressed SI and brought a knife to her throat.  Patient provided both urine specimen and routine blood work and had a BAL of 66. Pt then eloped from hospital but pt's boyfriend and [facility] showed staff a video of her holding a knife to her throat. Upon being brought back by PD, she was apologetic for leaving and cooperative. Of note, pt was seen in the ED thrice in the past for similar ED presentations of emotional disturbance following altercations with her boyfriend (upon chart review, in 12/2022 for a stomach ache after arguing with boyfriend, in 11/2022 after throwing things and voicing SI, and in 12/2019 after voicing SI wherein pt was seen by psychiatry, dx'ed with adjustment d/o and discharged back to OP provider). Psychiatry was consulted for suicidal ideation.     Upon evaluation, pt's presentation is most consistent with adjustment d/o, given feelings of depressed mood and anxiety in the context of multiple recent social stressors, in this case, frequent arguments with her significant other. Pt has had multiple past similar presentations of emotional disturbances associated with threats of SI (w/o intent or plan) with each presentation resulting in safe discharges home. The pt's impulsivity and poor frustration tolerance may be accounted for by multiple factors, including substance use, untreated ADHD, and destabilizing social stressors. Pt would benefit from returning to her OP providers for continued med management and therapy; as we have been unable to confirm whether the pt's case is still open at [facility], pt will also be provided with outpatient referrals. She is not acutely manic, severely depressed/anxious or psychotic, and does not meet criteria for an involuntary admission. Pt has contracted for safety (plan documented in EHR).     Recommendations:  - Psychiatrically cleared for DC.  - recommend f/u with her OP providers for continued med management and therapy; as we have been unable to confirm whether the pt's case is still open at [facility], pt will also be provided with outpatient referrals.  - Safety plan documented in EHR

## 2023-01-27 NOTE — ED BEHAVIORAL HEALTH ASSESSMENT NOTE - NSBHSACONSEQUENCE_PSY_A_CORE FT
AA, detox, residential rehab at [facility], CDOP on [address] with Dr. Rodríguez, most recently in care with Dr. Gan and Veronica Alvarez, ARW, at [facility] (dual focus).

## 2023-01-27 NOTE — ED BEHAVIORAL HEALTH ASSESSMENT NOTE - RISK ASSESSMENT
RFs of substance use disorder, impulsivity, affective dysregulation, social stressors, mixed compliance, and poor frustration tolerance are mitigated by PFs of therapeutic alliance, help-seeking behavior and future-orientation. Pt's acute and chronic risk for suicide is low. Pt's acute and chronic risk for violent behavior towards others is low based upon lack of Hx or current ideation.

## 2023-01-27 NOTE — ED PROVIDER NOTE - PATIENT PORTAL LINK FT
You can access the FollowMyHealth Patient Portal offered by [facility] by registering at the following website: [email or web address]. By joining [employer]’s FollowMyHealth portal, you will also be able to view your health information using other applications (apps) compatible with our system.

## 2023-01-27 NOTE — ED PROVIDER NOTE - ATTENDING APP SHARED VISIT CONTRIBUTION OF CARE
41 yo F with hx of anxiety, depression, substance abuse on suboxone who was BIBEMS with [facility] for SI. Pt says she got into a verbal argument with her boyfriend of 7 yrs and said that she didn't want to live anymore but denies SI or HI. Denies prior suicide attempts. Says she has just been under stress lately and said stuff she didn't mean. Admits to drinking alcohol earlier, no drugs. Denies any medical complaints. Says she and boyfriend never have been violent with each other and she feels safe at home. Per collateral obtained from boyfriend, boyfriend's daughter, and [facility], pt had held a large kitchen knife to her neck earlier and threatened to cut her throat if anyone entered the room. Boyfriend's daughter has video recording of pt saying "I'm going to slit my throat in front of you" to boyfriend. Boyfrienmiya says pt has not been taking her psych meds (wellbutrin, buspar, vyvanse) or seeing her therapist for past 1 mo. Denies any access to firearms at home. Per chart review, pt has had multiple ED visits for similar complaints stemming from verbal altercation with boyfriend. Was seen by psych once and cleared.    CONSTITUTIONAL: well developed, nontoxic appearing, in no acute distress, speaking in full sentences  SKIN: warm, dry, no rash, cap refill < 2 seconds  HEENT: normocephalic, atraumatic, no conjunctival erythema, moist mucous membranes, patent airway  NECK: supple  CV:  regular rate, regular rhythm, 2+ radial pulses bilaterally  RESP: no wheezes, no rales, no rhonchi, normal work of breathing  ABD: soft, no tenderness, nondistended, no rebound, no guarding  MSK: normal ROM, no cyanosis, no edema  NEURO: alert, oriented, grossly unremarkable  PSYCH: tearful but cooperative    Pt here with suicidal statement/gesture in setting of etoh use and verbal altercation with boyfriend. Possible adjustment disorder vs substance induced mood disorder however does have psychiatric hx. Will obtain psych labs, place on 1:1, obtain psych c/s.

## 2023-02-11 NOTE — ED ADULT NURSE NOTE - CAS TRG GENERAL AIRWAY, MLM
Patent This was a shared visit with the CAROLYNE. I reviewed and verified the documentation and independently performed the documented:

## 2023-04-17 ENCOUNTER — INPATIENT (INPATIENT)
Facility: HOSPITAL | Age: 43
LOS: 7 days | Discharge: HOME | DRG: 751 | End: 2023-04-25
Attending: PSYCHIATRY & NEUROLOGY | Admitting: PSYCHIATRY & NEUROLOGY
Payer: MEDICAID

## 2023-04-17 VITALS
OXYGEN SATURATION: 99 % | TEMPERATURE: 96 F | RESPIRATION RATE: 20 BRPM | DIASTOLIC BLOOD PRESSURE: 86 MMHG | SYSTOLIC BLOOD PRESSURE: 124 MMHG | HEART RATE: 92 BPM

## 2023-04-17 DIAGNOSIS — F11.90 OPIOID USE, UNSPECIFIED, UNCOMPLICATED: ICD-10-CM

## 2023-04-17 DIAGNOSIS — F14.10 COCAINE ABUSE, UNCOMPLICATED: ICD-10-CM

## 2023-04-17 DIAGNOSIS — F32.A DEPRESSION, UNSPECIFIED: ICD-10-CM

## 2023-04-17 DIAGNOSIS — F19.90 OTHER PSYCHOACTIVE SUBSTANCE USE, UNSPECIFIED, UNCOMPLICATED: ICD-10-CM

## 2023-04-17 PROBLEM — F19.10 OTHER PSYCHOACTIVE SUBSTANCE ABUSE, UNCOMPLICATED: Chronic | Status: ACTIVE | Noted: 2023-01-27

## 2023-04-17 PROBLEM — F41.9 ANXIETY DISORDER, UNSPECIFIED: Chronic | Status: ACTIVE | Noted: 2023-01-27

## 2023-04-17 LAB
ANION GAP SERPL CALC-SCNC: 11 MMOL/L — SIGNIFICANT CHANGE UP (ref 7–14)
APAP SERPL-MCNC: <5 UG/ML — LOW (ref 10–30)
APPEARANCE UR: CLEAR — SIGNIFICANT CHANGE UP
BACTERIA # UR AUTO: NEGATIVE — SIGNIFICANT CHANGE UP
BASOPHILS # BLD AUTO: 0.03 K/UL — SIGNIFICANT CHANGE UP (ref 0–0.2)
BASOPHILS NFR BLD AUTO: 0.3 % — SIGNIFICANT CHANGE UP (ref 0–1)
BILIRUB UR-MCNC: NEGATIVE — SIGNIFICANT CHANGE UP
BUN SERPL-MCNC: 22 MG/DL — HIGH (ref 10–20)
CALCIUM SERPL-MCNC: 9.5 MG/DL — SIGNIFICANT CHANGE UP (ref 8.4–10.5)
CHLORIDE SERPL-SCNC: 105 MMOL/L — SIGNIFICANT CHANGE UP (ref 98–110)
CO2 SERPL-SCNC: 25 MMOL/L — SIGNIFICANT CHANGE UP (ref 17–32)
COD CRY URNS QL: NEGATIVE — SIGNIFICANT CHANGE UP
COLOR SPEC: YELLOW — SIGNIFICANT CHANGE UP
CREAT SERPL-MCNC: 0.8 MG/DL — SIGNIFICANT CHANGE UP (ref 0.7–1.5)
DIFF PNL FLD: ABNORMAL
EGFR: 93 ML/MIN/1.73M2 — SIGNIFICANT CHANGE UP
EOSINOPHIL # BLD AUTO: 0.35 K/UL — SIGNIFICANT CHANGE UP (ref 0–0.7)
EOSINOPHIL NFR BLD AUTO: 3.7 % — SIGNIFICANT CHANGE UP (ref 0–8)
EPI CELLS # UR: ABNORMAL /HPF
ETHANOL SERPL-MCNC: <10 MG/DL — SIGNIFICANT CHANGE UP
GLUCOSE SERPL-MCNC: 103 MG/DL — HIGH (ref 70–99)
GLUCOSE UR QL: NEGATIVE MG/DL — SIGNIFICANT CHANGE UP
GRAN CASTS # UR COMP ASSIST: NEGATIVE — SIGNIFICANT CHANGE UP
HCT VFR BLD CALC: 35.2 % — LOW (ref 37–47)
HGB BLD-MCNC: 11.8 G/DL — LOW (ref 12–16)
HYALINE CASTS # UR AUTO: NEGATIVE — SIGNIFICANT CHANGE UP
IMM GRANULOCYTES NFR BLD AUTO: 0.3 % — SIGNIFICANT CHANGE UP (ref 0.1–0.3)
KETONES UR-MCNC: 15
LEUKOCYTE ESTERASE UR-ACNC: NEGATIVE — SIGNIFICANT CHANGE UP
LYMPHOCYTES # BLD AUTO: 1.79 K/UL — SIGNIFICANT CHANGE UP (ref 1.2–3.4)
LYMPHOCYTES # BLD AUTO: 19 % — LOW (ref 20.5–51.1)
MCHC RBC-ENTMCNC: 30.3 PG — SIGNIFICANT CHANGE UP (ref 27–31)
MCHC RBC-ENTMCNC: 33.5 G/DL — SIGNIFICANT CHANGE UP (ref 32–37)
MCV RBC AUTO: 90.5 FL — SIGNIFICANT CHANGE UP (ref 81–99)
MONOCYTES # BLD AUTO: 0.59 K/UL — SIGNIFICANT CHANGE UP (ref 0.1–0.6)
MONOCYTES NFR BLD AUTO: 6.3 % — SIGNIFICANT CHANGE UP (ref 1.7–9.3)
NEUTROPHILS # BLD AUTO: 6.61 K/UL — HIGH (ref 1.4–6.5)
NEUTROPHILS NFR BLD AUTO: 70.4 % — SIGNIFICANT CHANGE UP (ref 42.2–75.2)
NITRITE UR-MCNC: NEGATIVE — SIGNIFICANT CHANGE UP
NRBC # BLD: 0 /100 WBCS — SIGNIFICANT CHANGE UP (ref 0–0)
PH UR: 5.5 — SIGNIFICANT CHANGE UP (ref 5–8)
PLATELET # BLD AUTO: 291 K/UL — SIGNIFICANT CHANGE UP (ref 130–400)
PMV BLD: 10.9 FL — HIGH (ref 7.4–10.4)
POTASSIUM SERPL-MCNC: 3.9 MMOL/L — SIGNIFICANT CHANGE UP (ref 3.5–5)
POTASSIUM SERPL-SCNC: 3.9 MMOL/L — SIGNIFICANT CHANGE UP (ref 3.5–5)
PROT UR-MCNC: NEGATIVE MG/DL — SIGNIFICANT CHANGE UP
RBC # BLD: 3.89 M/UL — LOW (ref 4.2–5.4)
RBC # FLD: 12.1 % — SIGNIFICANT CHANGE UP (ref 11.5–14.5)
RBC CASTS # UR COMP ASSIST: ABNORMAL /HPF
SALICYLATES SERPL-MCNC: <0.3 MG/DL — LOW (ref 4–30)
SARS-COV-2 RNA SPEC QL NAA+PROBE: SIGNIFICANT CHANGE UP
SODIUM SERPL-SCNC: 141 MMOL/L — SIGNIFICANT CHANGE UP (ref 135–146)
SP GR SPEC: >=1.03 (ref 1.01–1.03)
TRI-PHOS CRY UR QL COMP ASSIST: NEGATIVE — SIGNIFICANT CHANGE UP
URATE CRY FLD QL MICRO: NEGATIVE — SIGNIFICANT CHANGE UP
UROBILINOGEN FLD QL: 0.2 MG/DL — SIGNIFICANT CHANGE UP
WBC # BLD: 9.4 K/UL — SIGNIFICANT CHANGE UP (ref 4.8–10.8)
WBC # FLD AUTO: 9.4 K/UL — SIGNIFICANT CHANGE UP (ref 4.8–10.8)
WBC UR QL: SIGNIFICANT CHANGE UP /HPF

## 2023-04-17 PROCEDURE — 99285 EMERGENCY DEPT VISIT HI MDM: CPT

## 2023-04-17 RX ORDER — IBUPROFEN 200 MG
600 TABLET ORAL ONCE
Refills: 0 | Status: COMPLETED | OUTPATIENT
Start: 2023-04-17 | End: 2023-04-17

## 2023-04-17 RX ADMIN — Medication 600 MILLIGRAM(S): at 22:08

## 2023-04-17 NOTE — ED PROVIDER NOTE - CLINICAL SUMMARY MEDICAL DECISION MAKING FREE TEXT BOX
42-year-old female with history of polysubstance abuse on Suboxone, was discharged inpatient rehab a few weeks ago, presents for depression, suicidal thoughts.  She was initially under arrest for violating order of protection towards boyfriend but subsequently arraigned.  Vitals noted, triage note reviewed, and all ED and behavioral health notes personally reviewed.  Patient unable to safety plan and voluntary inpatient psychiatric admission appropriate.  Discussed with patient at the bedside.  Patient is medically clear for admission under psychiatry.

## 2023-04-17 NOTE — ED BEHAVIORAL HEALTH ASSESSMENT NOTE - RISK ASSESSMENT
risk factors: substance abuse, impulsivity, limited social supports, psychosocial/interpersonal stressors, legal issues, untreated depression, non-compliance w/ care    protective: children, no past suicidal behaviors, denying any active SI, somewhat motivated to get help

## 2023-04-17 NOTE — ED ADULT NURSE REASSESSMENT NOTE - NS ED NURSE REASSESS COMMENT FT1
pt's boyfriend's phone number [phone]  another family contact number Humberto Alexis [phone]

## 2023-04-17 NOTE — ED BEHAVIORAL HEALTH ASSESSMENT NOTE - HPI (INCLUDE ILLNESS QUALITY, SEVERITY, DURATION, TIMING, CONTEXT, MODIFYING FACTORS, ASSOCIATED SIGNS AND SYMPTOMS)
Patient is a 41 yo woman, recently staying at a sober house, previously living with boyfriend of 7y, has two kids ages 12 and 9 under care of her sister, unemployed, with no significant PMH, and psych hx of polysubstance abuse (opioid, cannabis, etoh, methamphetamine, cocaine), on Suboxone, recently discharged from inpatient rehab 2-3 weeks ago, non-compliant w/ outpatient care since then, otherwise no previous psychiatric hospitalizations, no history of suicide attempts, who was BIB police under arrest after visiting her boyfriend and violating order of protection. Psychiatry was consulted as patient is endorsing SI.    On interview the patient is dysphoric, tearful, somewhat anxious with racing speech. She acknowledges in the past saying things like she wants to die or wants to kill herself, which would usually be transient in response to a trigger, but recently she has been feeling more down and hopeless. She states she has not felt quite this bad before. Describes feeling "miserable" and "I don't wanna fix things, I don't care." She reports feeling like this immediately after getting out of rehab. Said her mood was flat and she had urges to get high again. She believes nothing in her life is going well and there is nothing to live for ("everything sucks"). At the same time, she denies any urge to harm herself saying her daughters have always been a strong protective factor. She has no plan or ideation about ending her own life, and instead says repeatedly that she just no longer wants to "feel this way."    I talked to patient about a potential voluntary psychiatric hospitalization, and she is somewhat hesitant at first as she is a bit frightened by the idea, and also unsure whether it would help or she could feel better. Most times when discussing treatment and the future, patient just breaks down into tears and isn't firmly committed to any decision. She is open to it at my recommendation.    Patient reports she last drank several beers last night and this morning, used cannabis this morning, and also used cocaine last night. She last used crystal meth about two weeks ago.

## 2023-04-17 NOTE — ED BEHAVIORAL HEALTH ASSESSMENT NOTE - NSACTIVEVENT_PSY_ALL_CORE
Triggering events leading to humiliation, shame, and/or despair (e.g., Loss of relationship, financial or health status) (real or anticipated)/Current or pending social isolation/Substance intoxication or withdrawal/Inadequate social supports/Perceived burden on family or others

## 2023-04-17 NOTE — ED BEHAVIORAL HEALTH ASSESSMENT NOTE - DESCRIPTION
calm, cooperative, NAD unstable relationship w/ bf of 7 years who has order of protection against pt following incident in 2020; pt has 2 kids aged 12 and 9, under care of her sister; currently unemployed, last worked 1y ago denies, but per chart GERD, type 2 DM

## 2023-04-17 NOTE — ED BEHAVIORAL HEALTH ASSESSMENT NOTE - VIOLENCE RISK FACTORS:
Substance abuse/Affective dysregulation/Impulsivity/Noncompliance with treatment/Community stressors that increase the risk of destabilization/Irritability

## 2023-04-17 NOTE — ED BEHAVIORAL HEALTH ASSESSMENT NOTE - SUMMARY
Patient is a 41 yo woman, recently staying at a sober house, previously living with boyfriend of 7y, has two kids ages 12 and 9 under care of her sister, unemployed, with no significant PMH, and psych hx of polysubstance abuse (opioid, cannabis, etoh, methamphetamine, cocaine), on Suboxone, recently discharged from inpatient rehab 2-3 weeks ago, non-compliant w/ outpatient care since then, otherwise no previous psychiatric hospitalizations, no history of suicide attempts, who was BIB police under arrest after visiting her boyfriend and violating order of protection. Psychiatry was consulted as patient is endorsing SI.    Based on how patient describes the course of her symptoms, it appears that she has multiple ongoing psychosocial stressors which lead her to feel dysphoric and purposeless, which in turn influences her desire to get high on substances. She has benefited somewhat from rehab in the past including recently, but the improvement has been fleeting, as I believe there is also an underlying predisposition to depression and poor coping mechanisms. Therefore patient might also benefit from psychiatric hospitalization given currently her limited social supports, ongoing propensity for substance abuse as a maladaptive coping mechanism, emotional dysregulation & anxiety, and passive suicidal thoughts over the past several weeks. As she also used cocaine last night and drank this morning, these are likely exacerbating her mood state, so we will reassess her after she is given further time to metabolize substance. If she remains in the same state, she could benefit from hospitalization. If she appears improved, she might be appropriate for referral to rehab or outpatient treatment, so long as she can commit to safety.    Plan:  - hold for reassessment in the morning due to recent cocaine & etoh use  - if pt presents similarly, will consider voluntary psychiatric hospitalization  - continue [facility] protocol  - can continue buprenorphine-naloxone 2-0.5 mg sl tablet TID  - for acute agitation, can give Haldol 5mg + Ativan 2mg + Benadryl 50mg IM q6h PRN

## 2023-04-17 NOTE — ED PROVIDER NOTE - OBJECTIVE STATEMENT
this is a 43 yo female presents to ed for depression. patient states she has thoughts of hurting herself but has no plan

## 2023-04-17 NOTE — ED BEHAVIORAL HEALTH ASSESSMENT NOTE - OTHER
sober house, unstably also staying with boyfriend sometimes see above boyfriend called 792 restlessness "frazzled"

## 2023-04-17 NOTE — ED BEHAVIORAL HEALTH NOTE - BEHAVIORAL HEALTH NOTE
===================   PRE-HOSPITAL COURSE   ==================   SOURCE: ED documentation and ED provider   DETAILS:  Pt bibEMS/PD after violating order of protection placed by family. Pt endorsing SI.     ============   ED COURSE   ============   SOURCE: Secondhand ED documentation.   ARRIVAL: Patient bibEMS to ED. Patient cooperative with triage process.    BELONGINGS:  Patient arrived with belongings. All belongings were provided to hospital security, and patient currently in a gown with a 1:1 staff member.   BEHAVIOR: ED provider described pt as cooperative, remains in behavioral and impulse control, and is not in restraints. Pt currently has 1:1 sitter.  Unable to determine if pt is displaying aggression towards staff or others. Pt has a linear thought process. Pt endorsing SI d/t being unable to contact her family after losing custody of her children. Recent substance use this morning (cocaine and etoh).   TREATMENT:  No restraints.    VISITORS: None currently    -----------------------------------------------   COVID Exposure Screen- collateral (i.e. third-party, chart review, belongings, etc; include EMS and ED staff)   ---------------------------------------------------   1. Has the patient had a COVID-19 test in the last 90 days? Unknown.   2. Has the patient tested positive for COVID-19 antibodies? Unknown.   3.Has the patient received 2 doses of the COVID-19 vaccine?  Unknown.   4. In the past 10 days, has the patient been around anyone with a positive COVID-19 test?* Unknown.   5.Has the patient been out of [place] within the past 10 days? Unknown.

## 2023-04-17 NOTE — ED BEHAVIORAL HEALTH ASSESSMENT NOTE - DETAILS
11yo and 10yo daughters under care of sister 2 children are in sister's custody following pt failing a urine drug test while children were in her custody; per pt, ex-partner at the time (children's father) had reported her in an attempt to reconcile with her. father and maternal grandmother - alcohol use d/o, daughter with autism. n/a d/w PA per HPI

## 2023-04-17 NOTE — ED PROVIDER NOTE - IV ALTEPLASE EXCL ABS HIDDEN
Ventricular Rate : 105   Atrial Rate : 131   QRS Duration : 84   Q-T Interval : 368   QTC Calculation(Bezet) : 486   R Axis : 260   T Axis : 47   Diagnosis : Atrial fibrillation with rapid ventricular response~Right superior axis deviation~Poor R wave progression (cited on or before 16-JUL-2018)~Abnormal ECG~When compared with ECG of 16-JUL-2018 12:20,~No significant change was found~Confirmed by Vaughn LOPEZ MD ([id number]) on 9/23/2018 7:41:11 AM     
show

## 2023-04-17 NOTE — ED ADULT TRIAGE NOTE - CHIEF COMPLAINT QUOTE
[place] [employer] under [place], patient violated order of protection with boyfriend. Patient reports feeling depressed and not wanting to live anymore. Endorsing suicidal ideations, no plan at this time

## 2023-04-17 NOTE — ED PROVIDER NOTE - PROGRESS NOTE DETAILS
Supervised care of this patient.  Patient was seen by telepsych.  They will reevaluate patient in the a.m.  Patient is currently under arrest.  Plan is to have the patient remain in the ED for reevaluation in the a.m. co- pt endorsed to Dr. Hernandez- pending AM psych reassessment Apparently the patient is a voluntary admission to psych and is currently awaiting  arraignment by the police.Once that is accomplished pt can be admitted to any psych facility as the pt will not be under care of police. Mandi - s/o Dr. Perez MM: Spoke with telepsych, patient okay to restart on Wellbutrin  daily and Suboxone 2/0.5 3 times daily as well as gabapentin 600.

## 2023-04-17 NOTE — ED BEHAVIORAL HEALTH ASSESSMENT NOTE - CURRENT MEDICATION
Last prescribed (not taking for past 2 weeks):  - Suboxone 2/0.5mg TID  - gabapentin, Buspar, clonidine, Strattera, Wellbutrin

## 2023-04-18 LAB
AMPHET UR-MCNC: POSITIVE
BARBITURATES UR SCN-MCNC: NEGATIVE — SIGNIFICANT CHANGE UP
BENZODIAZ UR-MCNC: NEGATIVE — SIGNIFICANT CHANGE UP
COCAINE METAB.OTHER UR-MCNC: POSITIVE
DRUG SCREEN 1, URINE RESULT: SIGNIFICANT CHANGE UP
FENTANYL UR QL: NEGATIVE — SIGNIFICANT CHANGE UP
METHADONE UR-MCNC: NEGATIVE — SIGNIFICANT CHANGE UP
OPIATES UR-MCNC: NEGATIVE — SIGNIFICANT CHANGE UP
OXYCODONE UR-MCNC: NEGATIVE — SIGNIFICANT CHANGE UP
PCP UR-MCNC: NEGATIVE — SIGNIFICANT CHANGE UP
PROPOXYPHENE QUALITATIVE URINE RESULT: NEGATIVE — SIGNIFICANT CHANGE UP
THC UR QL: NEGATIVE — SIGNIFICANT CHANGE UP

## 2023-04-18 RX ORDER — BUPROPION HYDROCHLORIDE 150 MG/1
150 TABLET, EXTENDED RELEASE ORAL DAILY
Refills: 0 | Status: DISCONTINUED | OUTPATIENT
Start: 2023-04-18 | End: 2023-04-21

## 2023-04-18 RX ORDER — NICOTINE POLACRILEX 2 MG
1 GUM BUCCAL ONCE
Refills: 0 | Status: COMPLETED | OUTPATIENT
Start: 2023-04-18 | End: 2023-04-18

## 2023-04-18 RX ORDER — IBUPROFEN 200 MG
600 TABLET ORAL ONCE
Refills: 0 | Status: COMPLETED | OUTPATIENT
Start: 2023-04-18 | End: 2023-04-18

## 2023-04-18 RX ORDER — BUPRENORPHINE AND NALOXONE 2; .5 MG/1; MG/1
1 TABLET SUBLINGUAL
Refills: 0 | Status: DISCONTINUED | OUTPATIENT
Start: 2023-04-18 | End: 2023-04-20

## 2023-04-18 RX ORDER — GABAPENTIN 400 MG/1
600 CAPSULE ORAL DAILY
Refills: 0 | Status: DISCONTINUED | OUTPATIENT
Start: 2023-04-18 | End: 2023-04-21

## 2023-04-18 RX ADMIN — Medication 600 MILLIGRAM(S): at 09:15

## 2023-04-18 RX ADMIN — BUPROPION HYDROCHLORIDE 150 MILLIGRAM(S): 150 TABLET, EXTENDED RELEASE ORAL at 17:53

## 2023-04-18 RX ADMIN — Medication 1 PATCH: at 10:27

## 2023-04-18 RX ADMIN — Medication 600 MILLIGRAM(S): at 16:09

## 2023-04-18 RX ADMIN — GABAPENTIN 600 MILLIGRAM(S): 400 CAPSULE ORAL at 17:54

## 2023-04-18 RX ADMIN — BUPRENORPHINE AND NALOXONE 1 TABLET(S): 2; .5 TABLET SUBLINGUAL at 17:53

## 2023-04-18 NOTE — ED BEHAVIORAL HEALTH PROGRESS NOTE - NS ED BHA PLAN HOLD IN ED SUBSTANCE ISSUES2 FT
SREE, though patient reports that she has only been drinking for the last week, 3 or 4 times, mostly 3-4 beers but one night a pint of vodka, denies feeling like she is withdrawing, denies prior seizures.  Per ISTOP patient is on suboxone 2-0.5mg SL TID prescribed 3/16/2023 for 30 days from [facility] Stephanie Sheth

## 2023-04-18 NOTE — ED BEHAVIORAL HEALTH PROGRESS NOTE - NS ED BHA PLAN PSYCHIATRIC ISSUES2 FT
For agitation, haldol 5mg with ativan 2mg and benadryl 50mg PO or IM if severe. Will consider starting standing antidepressant if patient remains in the ED

## 2023-04-18 NOTE — ED BEHAVIORAL HEALTH PROGRESS NOTE - CASE SUMMARY/FORMULATION (CLEARLY DOCUMENT RATIONALE FOR DISPOSITION CHANGE)
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    Patient continues to report hopelessness and suicide ideation in the form of thoughts of overdosing on her clonidine, ongoing depression, does not feel safe being discharged to outpatient care at this time and wants admission. She has never been psychiatrically hospitalized before. Patient is currently under arrest and will need to be arraigned prior to transfer to inpatient psychiatric unit.

## 2023-04-18 NOTE — ED BEHAVIORAL HEALTH PROGRESS NOTE - DETAILS
Patient states that she has been thinking about overdosing on her clonidine which she states she knows can kill her

## 2023-04-19 DIAGNOSIS — F32.A DEPRESSION, UNSPECIFIED: ICD-10-CM

## 2023-04-19 PROCEDURE — 99232 SBSQ HOSP IP/OBS MODERATE 35: CPT

## 2023-04-19 PROCEDURE — 80061 LIPID PANEL: CPT

## 2023-04-19 PROCEDURE — 93010 ELECTROCARDIOGRAM REPORT: CPT

## 2023-04-19 RX ORDER — IBUPROFEN 200 MG
600 TABLET ORAL ONCE
Refills: 0 | Status: COMPLETED | OUTPATIENT
Start: 2023-04-19 | End: 2023-04-19

## 2023-04-19 RX ORDER — NICOTINE POLACRILEX 2 MG
2 GUM BUCCAL
Refills: 0 | Status: DISCONTINUED | OUTPATIENT
Start: 2023-04-19 | End: 2023-04-25

## 2023-04-19 RX ORDER — NICOTINE POLACRILEX 2 MG
1 GUM BUCCAL DAILY
Refills: 0 | Status: DISCONTINUED | OUTPATIENT
Start: 2023-04-19 | End: 2023-04-22

## 2023-04-19 RX ORDER — HYDROXYZINE HCL 10 MG
50 TABLET ORAL AT BEDTIME
Refills: 0 | Status: DISCONTINUED | OUTPATIENT
Start: 2023-04-19 | End: 2023-04-25

## 2023-04-19 RX ORDER — HYDROXYZINE HCL 10 MG
50 TABLET ORAL ONCE
Refills: 0 | Status: COMPLETED | OUTPATIENT
Start: 2023-04-19 | End: 2023-04-19

## 2023-04-19 RX ORDER — IBUPROFEN 200 MG
400 TABLET ORAL EVERY 6 HOURS
Refills: 0 | Status: DISCONTINUED | OUTPATIENT
Start: 2023-04-19 | End: 2023-04-25

## 2023-04-19 RX ADMIN — BUPROPION HYDROCHLORIDE 150 MILLIGRAM(S): 150 TABLET, EXTENDED RELEASE ORAL at 12:07

## 2023-04-19 RX ADMIN — Medication 400 MILLIGRAM(S): at 23:40

## 2023-04-19 RX ADMIN — GABAPENTIN 600 MILLIGRAM(S): 400 CAPSULE ORAL at 12:07

## 2023-04-19 RX ADMIN — Medication 50 MILLIGRAM(S): at 01:44

## 2023-04-19 RX ADMIN — BUPRENORPHINE AND NALOXONE 1 TABLET(S): 2; .5 TABLET SUBLINGUAL at 05:29

## 2023-04-19 RX ADMIN — BUPRENORPHINE AND NALOXONE 1 TABLET(S): 2; .5 TABLET SUBLINGUAL at 20:10

## 2023-04-19 RX ADMIN — Medication 1 PATCH: at 14:23

## 2023-04-19 RX ADMIN — BUPRENORPHINE AND NALOXONE 1 TABLET(S): 2; .5 TABLET SUBLINGUAL at 14:22

## 2023-04-19 RX ADMIN — Medication 0.5 MILLIGRAM(S): at 18:29

## 2023-04-19 RX ADMIN — Medication 600 MILLIGRAM(S): at 01:45

## 2023-04-19 RX ADMIN — Medication 400 MILLIGRAM(S): at 23:09

## 2023-04-19 RX ADMIN — Medication 400 MILLIGRAM(S): at 14:23

## 2023-04-19 RX ADMIN — Medication 1 PATCH: at 20:07

## 2023-04-19 RX ADMIN — Medication 2 MILLIGRAM(S): at 21:00

## 2023-04-19 NOTE — ED BEHAVIORAL HEALTH PROGRESS NOTE - LACKING INFO FOR PSYCH DISPO DETAILS FREE TEXT
Patient under arrest for violating order of protection 
Patient had used alcohol and cocaine yesterday morning

## 2023-04-19 NOTE — BH PATIENT PROFILE - HOME MEDICATIONS
Suboxone , 6 milligram(s) sublingual once a day  Wellbutrin , 300 milligram(s) orally once a day  cloNIDine 0.1 mg oral tablet

## 2023-04-19 NOTE — ED BEHAVIORAL HEALTH PROGRESS NOTE - BILLING CODES
16843-[id number] hospital care - moderate complexity 30-39 minutes [id number]-[id number] hospital care - high complexity 40-54 minutes

## 2023-04-19 NOTE — BH PATIENT PROFILE - HAS THE PATIENT EXPERIENCED ANY OF THE FOLLOWING WITHIN THE WEEK PRIOR TO ADMISSION?
"HPI     New pt/self referral     Pt comes in today for update rx gls and clt's. Pt states she was last exam   was 4 yrs ago. Pt is unsure what brand of clt's she is wearing or   prescription. Denies any discomfort.       Hx HSV OS--last 2011    Last edited by Leonel Lancaster, OD on 2/6/2018 10:49 AM. (History)        ROS     Negative for: Constitutional, Gastrointestinal, Neurological, Skin,   Genitourinary, Musculoskeletal, HENT, Endocrine, Cardiovascular, Eyes,   Respiratory, Psychiatric, Allergic/Imm, Heme/Lymph    Last edited by Leonel Lancaster, OD on 2/6/2018 11:58 AM. (History)        Assessment /Plan     For exam results, see Encounter Report.    Myopic astigmatism of both eyes      Pt wearing toric daily disp--had spare CLs with her, but they were generic with no markings.  They were in her purse, but pt not sure if they were old or new Rx, or even which eye they were supposed to be in.  Has her boxes at home, but wanted to start from scratch today.  Good fit/VA w [employer]    PLAN:    1. DISP 5 trial pairs  2. Continue Daily Wear schedule.  NO SLEEPING IN CONTACT LENSES. exchange daily  3. If no problems will call for CLRx, rtc 1 yr.  Also wrote spex Rx         Pt called--prefers new power, but wishes to go back to old brand--is wearing "[employer] Day", which is a private label for [employer] TOR[employer].  Changed CLRx in notes to reflect patients preference      "
no

## 2023-04-19 NOTE — BH PATIENT PROFILE - FALL HARM RISK - HARM RISK INTERVENTIONS

## 2023-04-19 NOTE — BH PATIENT PROFILE - NSBHATTSPAN_PSY_A_CORE
Pt arrives with diarrhea for the last 3 days. Per dad it had gotten a little better today. Pt still eating/drinking well. No c/o stomach pain. Afebrile.    fair

## 2023-04-19 NOTE — ED BEHAVIORAL HEALTH PROGRESS NOTE - RISK ASSESSMENT
As above
Protective factors are patients responsibility to family, no prior suicide attempts, identifies reasons to live. Risk factors are medication non-adherence, poor social support, housing instability, financial instability, depressed mood, substance use disorders, stressors (legal and relationship issues), impulsivity.

## 2023-04-19 NOTE — ED ADULT NURSE REASSESSMENT NOTE - NS ED NURSE REASSESS COMMENT FT1
Report received from Kate ELIZABETH. Pt received aaox3 in NAD with [employer] and 1:1 at bedside. Pt pending psych room assignment, denies any pain at present time.

## 2023-04-19 NOTE — ED BEHAVIORAL HEALTH PROGRESS NOTE - STANDING MEDS RECEIVED IN ED DETAILS FREE TEXT
Wellbutrin  mg daily - received at 1702 4/18  Gabapentin 600 mg daily - received at 1702 4/18  Nicotine patch 7 mg/24H  Suboxone 2 mg/0.5 mg - received at 1702 4/18

## 2023-04-19 NOTE — ED BEHAVIORAL HEALTH PROGRESS NOTE - CASE SUMMARY/FORMULATION (CLEARLY DOCUMENT RATIONALE FOR DISPOSITION CHANGE)
43 yo F, recently staying at a sober house, previously living with boyfriend of 7y, has two kids ages 12 and 9 under care of her sister, unemployed, with no significant PMH, and psych hx of substance use (opioid, cannabis, etoh, methamphetamine, cocaine), on Suboxone, recently discharged from inpatient rehab 2-3 weeks ago, non-adherent w/ outpatient care since then, otherwise no previous psychiatric hospitalizations, no history of suicide attempts, who was BIB police under arrest after visiting her boyfriend and violating order of protection.     Patient continues to have intermittent suicidal thoughts, low mood, and is help seeking to get treatment for depressive symptoms and pursue substance recovery treatment. Patient is unable to safety plan and voluntary inpatient psychiatric admission remains appropriate. Arraignment is planned for today per ED staff.   43 yo F, recently staying at a sober house, previously living with boyfriend of 7y, has two kids ages 12 and 9 under care of her sister, unemployed, with no significant PMH, and psych hx of substance use (opioid, cannabis, etoh, methamphetamine, cocaine), on Suboxone, recently discharged from inpatient rehab 2-3 weeks ago, non-adherent w/ outpatient care since then, otherwise no previous psychiatric hospitalizations, no history of suicide attempts, who was BIB police under arrest after visiting her boyfriend and violating order of protection.     Patient continues to have intermittent suicidal thoughts, low mood, and is help seeking to get treatment for depressive symptoms and pursue substance recovery treatment. Patient is unable to safety plan and voluntary inpatient psychiatric admission remains appropriate. Arraignment is planned for today per ED staff.    PLAN:    - Hold for bed, pending arraignment  - Continue Wellbutrin 150 mg daily, Gabapentin 600 mg daily, Suboxone 2-0.5mg SL TID  - For severe agitation not responding to behavioral intervention, may give haldol 5 mg, ativan 2 mg, hydroxyzine 50 mg Q6H PRN, with escalation to IM if patient refusing PO and remains an imminent danger to self or other    I spent a total of 50 minutes reviewing past medical records, evaluating the patient, discussing treatment options with the patient, prescribing medication, and documenting in the EMR.

## 2023-04-19 NOTE — ED BEHAVIORAL HEALTH PROGRESS NOTE - SUMMARY
Risk factors include current suicide ideation with method, inability to safety plan, depressed mood, hopelessness, ongoing substance abuse, relationship stress, under arrest, lack of adherence to outpatient treatment. Protective factors include lack of prior attempts, identification of reasons to live
Patient had used cocaine and alcohol prior to initial presentation and was held for reassessment of suicidality and mood symptoms following metabolization of substances. If symptoms continue, then patient might benefit from psychiatric hospitalization given current limited social supports, ongoing propensity for substance use as a maladaptive coping mechanism, emotional dysregulation & anxiety, and passive suicidal thoughts over the past several weeks. On reassessment, patient reported persistent suicidal thoughts with plan to overdose on her clonidine and admission to a psychiatric unit was recommended following arraignment as there are no available forensic psychiatric beds.

## 2023-04-19 NOTE — ED BEHAVIORAL HEALTH PROGRESS NOTE - DETAILS:
Patient reports that she continues to have intermittent thoughts of suicide and has been feeling "very low" over the last few months and wants to focus on treating her depressive symptoms. She says she has been on Zoloft in the past which caused side effects of loss of sex drive and Seroquel which caused weight gain, though she says they were helpful with reducing symptoms at the time. She says the Wellbutrin worked for awhile but seemed to become less effective over time. She says her longest period of sobriety was 8 years, starting from when she found out she was pregnant, and what helped her stay sober was focusing on caring for her children. She says she was functioning well, working, had a job, had custody of her kids, then she met her current boyfriend, started using with him in 2016, within a year had lost everything and has not been able to regain functioning. She says she is ready to start the process toward recovery starting with her mental health.

## 2023-04-20 LAB
CHOLEST SERPL-MCNC: 180 MG/DL — SIGNIFICANT CHANGE UP
HDLC SERPL-MCNC: 53 MG/DL — SIGNIFICANT CHANGE UP
LIPID PNL WITH DIRECT LDL SERPL: 114 MG/DL — HIGH
NON HDL CHOLESTEROL: 127 MG/DL — SIGNIFICANT CHANGE UP
TRIGL SERPL-MCNC: 63 MG/DL — SIGNIFICANT CHANGE UP

## 2023-04-20 PROCEDURE — 99221 1ST HOSP IP/OBS SF/LOW 40: CPT

## 2023-04-20 PROCEDURE — 99222 1ST HOSP IP/OBS MODERATE 55: CPT

## 2023-04-20 PROCEDURE — 99231 SBSQ HOSP IP/OBS SF/LOW 25: CPT

## 2023-04-20 RX ORDER — BUPRENORPHINE AND NALOXONE 2; .5 MG/1; MG/1
1 TABLET SUBLINGUAL
Refills: 0 | Status: DISCONTINUED | OUTPATIENT
Start: 2023-04-20 | End: 2023-04-22

## 2023-04-20 RX ADMIN — Medication 0.5 MILLIGRAM(S): at 16:20

## 2023-04-20 RX ADMIN — BUPRENORPHINE AND NALOXONE 1 TABLET(S): 2; .5 TABLET SUBLINGUAL at 20:51

## 2023-04-20 RX ADMIN — BUPRENORPHINE AND NALOXONE 1 TABLET(S): 2; .5 TABLET SUBLINGUAL at 13:23

## 2023-04-20 RX ADMIN — BUPRENORPHINE AND NALOXONE 1 TABLET(S): 2; .5 TABLET SUBLINGUAL at 06:00

## 2023-04-20 RX ADMIN — Medication 0.5 MILLIGRAM(S): at 23:09

## 2023-04-20 RX ADMIN — Medication 400 MILLIGRAM(S): at 23:01

## 2023-04-20 RX ADMIN — Medication 0.5 MILLIGRAM(S): at 00:37

## 2023-04-20 RX ADMIN — Medication 0.5 MILLIGRAM(S): at 09:06

## 2023-04-20 RX ADMIN — Medication 2 MILLIGRAM(S): at 20:51

## 2023-04-20 RX ADMIN — Medication 400 MILLIGRAM(S): at 23:40

## 2023-04-20 RX ADMIN — BUPROPION HYDROCHLORIDE 150 MILLIGRAM(S): 150 TABLET, EXTENDED RELEASE ORAL at 12:22

## 2023-04-20 RX ADMIN — Medication 1 PATCH: at 12:22

## 2023-04-20 RX ADMIN — GABAPENTIN 600 MILLIGRAM(S): 400 CAPSULE ORAL at 12:22

## 2023-04-20 RX ADMIN — Medication 2 MILLIGRAM(S): at 12:23

## 2023-04-20 RX ADMIN — Medication 1 PATCH: at 21:08

## 2023-04-20 RX ADMIN — Medication 50 MILLIGRAM(S): at 23:01

## 2023-04-20 RX ADMIN — Medication 50 MILLIGRAM(S): at 00:09

## 2023-04-20 NOTE — BH INPATIENT PSYCHIATRY ASSESSMENT NOTE - CURRENT PLAN:
Attempted to contact patient. No answer. Left a message for patient to call back.     Yes None known

## 2023-04-20 NOTE — BH SOCIAL WORK INITIAL PSYCHOSOCIAL EVALUATION - OTHER PAST PSYCHIATRIC HISTORY (INCLUDE DETAILS REGARDING ONSET, COURSE OF ILLNESS, INPATIENT/OUTPATIENT TREATMENT)
psych hx of polysubstance abuse (opioid, cannabis, etoh, methamphetamine, cocaine), on Suboxone, recently discharged from inpatient rehab 2-3 weeks ago, non-compliant w/ outpatient care since then, otherwise no previous psychiatric hospitalizations, no history of suicide attempts,

## 2023-04-20 NOTE — BH INPATIENT PSYCHIATRY ASSESSMENT NOTE - NSDCCRITERIA_PSY_ALL_CORE
- no longer expressing suicidal ideation  - improvement in depressive symptoms  - medication compliance

## 2023-04-20 NOTE — UM REPORT PROGRESS NOTE - NSUMRMPROVIDER_GEN_A_CORE FT
23 Michelle  Patient: Tova Cerna  : 1980  INSURANCE: [employer]   ID# [id number]  [phone]      Spoke to Mayda Chen # [id number]. Clinicals have been faxed to [phone]. Awaiting call back from assigned CM.    23 Michelle  Patient: Tova Cerna  : 1980  INSURANCE: [employer]   ID# [id number]  [phone]      Spoke to Mayda Chen # [id number]. Clinicals have been faxed to [phone]. Awaiting call back from assigned CM.   23 - Michelle  Patient approved from - - Auth # [id number]   LCD 23 - review on  with Татьяна [phone]  [phone]   23 Michelle  Patient: Tova Cerna  : 1980  INSURANCE: [employer]   ID# [id number]  [phone]      Spoke to Mayda Chen # [id number]. Clinicals have been faxed to [phone]. Awaiting call back from assigned CM.   23 - Michelle  Patient approved from - - Auth # [id number]   23 - Discharge clinicals left for Татьяна at 11:40 - Michelle  LCD 23 - review on  with Татьяна [phone]  [phone]

## 2023-04-20 NOTE — CONSULT NOTE ADULT - SUBJECTIVE AND OBJECTIVE BOX
ADELAIDE AUSTIN  42y, Female  Allergy: sulfa drugs (Unknown)  Sulfacetamide Sodium-Sulfur (Hives)      CHIEF COMPLAINT:     HPI:    HPI:   Charisse Kirkland | Reference #: [id number]    Rx Written	Rx Dispensed	Drug	Quantity	Days Supply	Prescriber Name	Prescriber Aspen #	Payment Method	Dispenser  02/14/2023	02/14/2023	vyvanse 50 mg capsule	30	30	Bhalodi, Celestino	[id number]	Medicaid	[facility] #[id number]  02/13/2023	02/13/2023	buprenorphine-naloxone 2-0.5 mg sl tablet	63	21	Ciarra Jolley MD	[id number]	Medicaid	[facility] #[id number]  02/10/2023	02/10/2023	buprenorphine-naloxone 8-2 mg sl film	3	3	Grady Caldwell	[id number]	Medicaid	[facility] #[id number]  10/07/2022	10/26/2022	dextroamp-amphet er 10 mg cap	30	30	Bhalodi, Celestino	[id number]	[facility] #[id number]  10/07/2022	10/18/2022	vyvanse 50 mg capsule	30	30	Bhalodi, Celestino	[id number]	Medicaid	[facility] #[id number]  09/08/2022	09/26/2022	dextroamp-amphet er 10 mg cap	30	30	Bhalodi, Celestino	[id number]	[facility] #[id number]    Rx Written	Rx Dispensed	Drug	Quantity	Days Supply	Prescriber Name	Prescriber Aspen #	Payment Method	Dispenser  03/16/2023	03/16/2023	buprenorphine-naloxone 2-0.5 mg sl film	90	30	Stephanie Sheth (LIANNA)	[id number]	Medicaid	[facility] #[id number]  03/08/2023	03/08/2023	buprenorphine-naloxone 2-0.5 mg sl film	21	7	Marichuy Thornton	[id number]	Medicaid	[facility] #[id number]  02/23/2023	02/23/2023	buprenorphine-naloxone 2-0.5 mg sl film	14	7	Chica Thornton A, PA	[id number]	Medicaid	[facility] #[id number] (20 Apr 2023 11:06)    FAMILY HISTORY:    PAST MEDICAL & SURGICAL HISTORY:  Depression      Anxiety      Substance abuse      No significant past surgical history          SOCIAL HISTORY  Social History:      Home Medications:  cloNIDine 0.1 mg oral tablet:  (30 Nov 2022 13:06)  Suboxone: 6 milligram(s) sublingual once a day (30 Nov 2022 13:06)  Wellbutrin: 300 milligram(s) orally once a day (30 Nov 2022 13:06)      ROS  General: Denies fevers, chills, nightsweats, weight loss  HEENT: Denies headache, rhinorrhea, sore throat, eye pain  CV: Denies CP, palpitations  PULM: Denies SOB, cough  GI: Denies abdominal pain, diarrhea  : Denies dysuria, hematuria  MSK: Denies arthralgias  SKIN: Denies rash   NEURO: Denies paresthesias, weakness      VITALS:  T(F): 97.1, Max: 97.1 (04-19-23 @ 15:15)  HR: 72  BP: 111/55  RR: 18Vital Signs Last 24 Hrs  T(C): 36.2 (19 Apr 2023 15:15), Max: 36.2 (19 Apr 2023 15:15)  T(F): 97.1 (19 Apr 2023 15:15), Max: 97.1 (19 Apr 2023 15:15)  HR: 72 (20 Apr 2023 09:39) (68 - 87)  BP: 111/55 (20 Apr 2023 09:39) (102/68 - 121/60)  BP(mean): --  RR: 18 (20 Apr 2023 09:39) (16 - 18)  SpO2: 100% (19 Apr 2023 15:15) (100% - 100%)    Parameters below as of 19 Apr 2023 15:15  Patient On (Oxygen Delivery Method): room air        PHYSICAL EXAM:  Gen: NAD, resting in bed  HEENT: Normocephalic, atraumatic  Neck: supple, no lymphadenopathy  CV: Regular rate & regular rhythm  Lungs: CTABL no wheeze  Abdomen: Soft, NTND+ BS present  Ext: Warm, well perfused no CCE  Neuro: non focal, awake, CN II-XII intact   Skin: no rash, no erythema      TESTS & MEASUREMENTS:                  COVID-19 PCR: NotDetec (17 Apr 2023 16:30)  COVID-19 PCR: NotDetec (27 Jan 2023 04:35)      QRS axis to [] ° and NSR at a rate of [] BPM. There was no atrial enlargement. There was no ventricular hypertrophy. There were no ST-T changes and all intervals were normal.      INFECTIOUS DISEASES TESTING      RADIOLOGY & ADDITIONAL TESTS:  I have personally reviewed the last Chest xray  CXR      CT      CARDIOLOGY TESTING  12 Lead ECG:   Ventricular Rate 68 BPM    Atrial Rate 68 BPM    P-R Interval 148 ms    QRS Duration 78 ms    Q-T Interval 436 ms    QTC Calculation(Bazett) 463 ms    P Axis 55 degrees    R Axis 64 degrees    T Axis 67 degrees    Diagnosis Line Normal sinus rhythm  Normal ECG    Confirmed by JOYCE MCCURDY MD ([id number]) on 4/20/2023 10:43:02 AM (04-19-23 @ 16:11)      MEDICATIONS  (STANDING):  buPROPion XL (24-Hour) 150 milliGRAM(s) Oral daily  gabapentin 600 milliGRAM(s) Oral daily  nicotine -   7 mG/24Hr(s) Patch 1 Patch Transdermal daily    MEDICATIONS  (PRN):  hydrOXYzine hydrochloride 50 milliGRAM(s) Oral at bedtime PRN sleep  ibuprofen  Tablet. 400 milliGRAM(s) Oral every 6 hours PRN Moderate Pain (4 - 6)  LORazepam     Tablet 0.5 milliGRAM(s) Oral every 6 hours PRN Anxiety  nicotine  Polacrilex Gum 2 milliGRAM(s) Oral every 3 hours PRN smoking cessation      ANTIBIOTICS:      All available historical data has been reviewed    ASSESSMENT  42y F admitted with Depression        PROBLEMS  
    Pt interviewed, examined and EMR chart reviewed.  Pt evaluation for polysubstance abuse.  Pt admits to drinking 1 pt three or four x per week for the las 2 weeks.  Last Drink 1 dpta  Pt completed rehab at [facility] recently.    Denies any Hx of withdrawal   variable periods of sobriety in the past.  Pt admits to using crack/cocaine 3-4 a week as well over the last 2 weeks.   Denies Withdrawal Seizures  Has been in detox before __X___yes,   _____No    SOCIAL HISTORY:    REVIEW OF SYSTEMS:    Constitutional: No fever, weight loss or fatigue  ENT:  No difficulty hearing, tinnitus, vertigo; No sinus or throat pain  Neck: No pain or stiffness  Respiratory: No cough, wheezing, chills or hemoptysis  Cardiovascular: No chest pain, palpitations, shortness of breath, dizziness or leg swelling  Gastrointestinal: No abdominal or epigastric pain. No nausea, vomiting or hematemesis; No diarrhea or constipation. No melena or hematochezia.  Neurological: No headaches, memory loss, loss of strength, numbness or tremors  Musculoskeletal: No joint pain or swelling; No muscle, back or extremity pain  Psychiatric:  See HPI    MEDICATIONS  (STANDING):  buprenorphine 2 mG/naloxone 0.5 mG SL  Tablet 1 Tablet(s) SubLingual <User Schedule>  buPROPion XL (24-Hour) 150 milliGRAM(s) Oral daily  gabapentin 600 milliGRAM(s) Oral daily  nicotine -   7 mG/24Hr(s) Patch 1 Patch Transdermal daily    MEDICATIONS  (PRN):  hydrOXYzine hydrochloride 50 milliGRAM(s) Oral at bedtime PRN sleep  ibuprofen  Tablet. 400 milliGRAM(s) Oral every 6 hours PRN Moderate Pain (4 - 6)  LORazepam     Tablet 0.5 milliGRAM(s) Oral every 6 hours PRN Anxiety  nicotine  Polacrilex Gum 2 milliGRAM(s) Oral every 3 hours PRN smoking cessation      Vital Signs Last 24 Hrs  T(C): 36.2 (19 Apr 2023 15:15), Max: 36.2 (19 Apr 2023 15:15)  T(F): 97.1 (19 Apr 2023 15:15), Max: 97.1 (19 Apr 2023 15:15)  HR: 72 (20 Apr 2023 09:39) (68 - 87)  BP: 111/55 (20 Apr 2023 09:39) (102/68 - 121/60)  BP(mean): --  RR: 18 (20 Apr 2023 09:39) (16 - 18)  SpO2: 100% (19 Apr 2023 15:15) (100% - 100%)    Parameters below as of 19 Apr 2023 15:15  Patient On (Oxygen Delivery Method): room air        PHYSICAL EXAM:    Constitutional: NAD, well-groomed, well-developed  HEENT: PERRLA, EOMI, Normal Hearing, MMM  Neck: No LAD, No JVD  Back: Normal spine flexure, No CVA tenderness  Respiratory: CTAB/L  Cardiovascular: S1 and S2, RRR, no M/G/R  Gastrointestinal: BS+, soft, NT/ND  Extremities: No peripheral edema  Vascular: 2+ peripheral pulses  Neurological: A/O x 3, no focal deficits    LABS:              Drug Screen Urine:  Alcohol Level  Alcohol, Blood: <10 mg/dL (04-17-23 @ 17:00)        RADIOLOGY & ADDITIONAL STUDIES:

## 2023-04-20 NOTE — BH INPATIENT PSYCHIATRY ASSESSMENT NOTE - NSACTIVEVENT_PSY_ALL_CORE
Legal problems/Substance intoxication or withdrawal/Inadequate social supports/Perceived burden on family or others

## 2023-04-20 NOTE — BH INPATIENT PSYCHIATRY ASSESSMENT NOTE - NSBHASSESSSUMMFT_PSY_ALL_CORE
Tova Davis is a 43 yo  woman, resides at [facility] ([facility]) for past 6 months, previously living with boyfriend of 7 years, has two kids ages 12 and 9 under care of her sister, unemployed, with no significant PMH, and past psych hx of polysubstance abuse (opioid, cannabis, etoh, methamphetamine, cocaine), on Suboxone, recently discharged from inpatient rehab 2-3 weeks ago, non-compliant w/ outpatient care since then, otherwise no previous psychiatric hospitalizations, no history of suicide attempts, who was BIB police under arrest after visiting her boyfriend and violating order of protection. Psychiatry was consulted as patient is endorsing SI.    Patient is tearful during interview, endorses feelings of hopelessness, mood lability,  Tova Davis is a 41 yo  woman, resides at [facility] ([facility]) for past 6 months, previously living with boyfriend of 7 years, has two kids ages 12 and 9 under care of her sister, unemployed, with no significant PMH, and past psych hx of polysubstance abuse (opioid, cannabis, etoh, methamphetamine, cocaine), on Suboxone, recently discharged from inpatient rehab 2-3 weeks ago, non-compliant w/ outpatient care since then, otherwise no previous psychiatric hospitalizations, no history of suicide attempts, who was BIB police under arrest after visiting her boyfriend and violating order of protection. Psychiatry was consulted as patient is endorsing SI.    Patient is tearful during interview, endorses feelings of hopelessness, mood lability, reports she had suicidal ideation to overdose on clonidine. She continues to express depressive symptoms and would benefit from continued inpatient psychiatric hospitalization for symptom and medication management and stabilization.     #Unspecified mood disorder  - c/w Wellbutrin XL 150mg qd-- home dose 300mg     #ADHD  - need to confirm prescriber for these medications  - pt reports on Strattera 40mg qd  - per pharmacy, pt on vyvanse 50mg qAM as of Feb 2023    #Opioid use disorder  - c/w suboxone 2mg TID  - c/w gabapentin 600mg qd-- home dose 800mg BID  - reports clonidine 0.1mg qd-- latest BP 4/20 120/76

## 2023-04-20 NOTE — BH INPATIENT PSYCHIATRY ASSESSMENT NOTE - NSBHCHARTREVIEWVS_PSY_A_CORE FT
Vital Signs Last 24 Hrs  T(C): 36.2 (04-19-23 @ 15:15), Max: 36.2 (04-19-23 @ 15:15)  T(F): 97.1 (04-19-23 @ 15:15), Max: 97.1 (04-19-23 @ 15:15)  HR: 72 (04-20-23 @ 09:39) (68 - 87)  BP: 111/55 (04-20-23 @ 09:39) (102/68 - 121/60)  BP(mean): --  RR: 18 (04-20-23 @ 09:39) (16 - 18)  SpO2: 100% (04-19-23 @ 15:15) (100% - 100%)     Vital Signs Last 24 Hrs  T(C): --  T(F): --  HR: 72 (04-20-23 @ 09:39) (68 - 87)  BP: 111/55 (04-20-23 @ 09:39) (102/68 - 121/60)  BP(mean): --  RR: 18 (04-20-23 @ 09:39) (18 - 18)  SpO2: --     Vital Signs Last 24 Hrs  T(C): --  T(F): --  HR: 92 (04-20-23 @ 16:03) (68 - 92)  BP: 120/76 (04-20-23 @ 16:03) (102/68 - 121/60)  BP(mean): --  RR: 16 (04-20-23 @ 16:03) (16 - 18)  SpO2: --

## 2023-04-20 NOTE — BH INPATIENT PSYCHIATRY ASSESSMENT NOTE - HPI (INCLUDE ILLNESS QUALITY, SEVERITY, DURATION, TIMING, CONTEXT, MODIFYING FACTORS, ASSOCIATED SIGNS AND SYMPTOMS)
Charisse Kirkland | Reference #: [id number]    Rx Written	Rx Dispensed	Drug	Quantity	Days Supply	Prescriber Name	Prescriber Aspen #	Payment Method	Dispenser  02/14/2023	02/14/2023	vyvanse 50 mg capsule	30	30	Celestino Gan	[id number]	Medicaid	[facility] #[id number]  02/13/2023	02/13/2023	buprenorphine-naloxone 2-0.5 mg sl tablet	63	21	Ciarra Jolley MD	[id number]	Medicaid	[facility] #[id number]  02/10/2023	02/10/2023	buprenorphine-naloxone 8-2 mg sl film	3	3	Grady Caldwell	[id number]	Medicaid	[facility] #[id number]  10/07/2022	10/26/2022	dextroamp-amphet er 10 mg cap	30	30	Bhalodi, Celestino	[id number]	[facility] #[id number]  10/07/2022	10/18/2022	vyvanse 50 mg capsule	30	30	BhalodiCelestino	[id number]	Medicaid	[facility] #[id number]  09/08/2022	09/26/2022	dextroamp-amphet er 10 mg cap	30	30	BhalodiCelestino	[id number]	[facility] #[id number]    Rx Written	Rx Dispensed	Drug	Quantity	Days Supply	Prescriber Name	Prescriber Aspen #	Payment Method	Dispenser  03/16/2023	03/16/2023	buprenorphine-naloxone 2-0.5 mg sl film	90	30	Stephanie Sheth (LIANNA)	[id number]	Medicaid	[facility] #[id number]  03/08/2023	03/08/2023	buprenorphine-naloxone 2-0.5 mg sl film	21	7	Marichuy Thornton	[id number]	Medicaid	[facility] #[id number]  02/23/2023	02/23/2023	buprenorphine-naloxone 2-0.5 mg sl film	14	7	Chica Thornton A, PA	[id number]	Medicaid	[facility] #[id number] Tova Davis is a 43 yo  woman, recently staying at a sober house, previously living with boyfriend of 7 years, has two kids ages 12 and 9 under care of her sister, unemployed, with no significant PMH, and past psych hx of polysubstance abuse (opioid, cannabis, etoh, methamphetamine, cocaine), on Suboxone, recently discharged from inpatient rehab 2-3 weeks ago, non-compliant w/ outpatient care since then, otherwise no previous psychiatric hospitalizations, no history of suicide attempts, who was BIB police under arrest after visiting her boyfriend and violating order of protection. Psychiatry was consulted as patient is endorsing SI.    Initial ED Assessment:  On interview the patient is dysphoric, tearful, somewhat anxious with racing speech. She acknowledges in the past saying things like she wants to die or wants to kill herself, which would usually be transient in response to a trigger, but recently she has been feeling more down and hopeless. She states she has not felt quite this bad before. Describes feeling "miserable" and "I don't wanna fix things, I don't care." She reports feeling like this immediately after getting out of rehab. Said her mood was flat and she had urges to get high again. She believes nothing in her life is going well and there is nothing to live for ("everything sucks"). At the same time, she denies any urge to harm herself saying her daughters have always been a strong protective factor. She has no plan or ideation about ending her own life, and instead says repeatedly that she just no longer wants to "feel this way."    Current Assessment:      Charisse Kirkland | Reference #: [id number]    Rx Written	Rx Dispensed	Drug	Quantity	Days Supply	Prescriber Name	Prescriber Aspen #	Payment Method	Dispenser  02/14/2023	02/14/2023	vyvanse 50 mg capsule	30	30	Celestino Gan	[id number]	Medicaid	[facility] #[id number]  02/13/2023	02/13/2023	buprenorphine-naloxone 2-0.5 mg sl tablet	63	21	Ciarra Jolley MD	[id number]	Medicaid	[facility] #[id number]  02/10/2023	02/10/2023	buprenorphine-naloxone 8-2 mg sl film	3	3	Grady Caldwell	[id number]	Medicaid	[facility] #[id number]  10/07/2022	10/26/2022	dextroamp-amphet er 10 mg cap	30	30	Celestino nunez	[id number]	[facility] #[id number]  10/07/2022	10/18/2022	vyvanse 50 mg capsule	30	30	[facility]	[id number]	Medicaid	[facility][id number]  09/08/2022	09/26/2022	dextroamp-amphet er 10 mg cap	30	30	[id number]	[facility] #[id number]    Rx Written	Rx Dispensed	Drug	Quantity	Days Supply	Prescriber Name	Prescriber Aspen #	Payment Method	Dispenser  03/16/2023	03/16/2023	buprenorphine-naloxone 2-0.5 mg sl film	90	30	Stephanie Sheth (LIANNA)	[id number]	Medicaid	[facility] #[id number]  03/08/2023	03/08/2023	buprenorphine-naloxone 2-0.5 mg sl film	21	7	Marichuy Thornton	[id number]	Medicaid	[facility] #[id number]  02/23/2023	02/23/2023	buprenorphine-naloxone 2-0.5 mg sl film	14	7	Chica Thornton A, PA	[id number]	Medicaid	[facility] #[id number] Tova Davis is a 41 yo  woman, resides at [facility] ([facility]) for past 6 months, previously living with boyfriend of 7 years, has two kids ages 12 and 9 under care of her sister, unemployed, with no significant PMH, and past psych hx of polysubstance abuse (opioid, cannabis, etoh, methamphetamine, cocaine), on Suboxone, recently discharged from inpatient rehab 2-3 weeks ago, non-compliant w/ outpatient care since then, otherwise no previous psychiatric hospitalizations, no history of suicide attempts, who was BIB police under arrest after visiting her boyfriend and violating order of protection. Psychiatry was consulted as patient is endorsing SI.    Initial ED Assessment:  On interview the patient is dysphoric, tearful, somewhat anxious with racing speech. She acknowledges in the past saying things like she wants to die or wants to kill herself, which would usually be transient in response to a trigger, but recently she has been feeling more down and hopeless. She states she has not felt quite this bad before. Describes feeling "miserable" and "I don't wanna fix things, I don't care." She reports feeling like this immediately after getting out of rehab. Said her mood was flat and she had urges to get high again. She believes nothing in her life is going well and there is nothing to live for ("everything sucks"). At the same time, she denies any urge to harm herself saying her daughters have always been a strong protective factor. She has no plan or ideation about ending her own life, and instead says repeatedly that she just no longer wants to "feel this way."    Current Assessment:  Patient was seen and evaluated this morning. Chart was reviewed and no acute events were noted. PRN medications were administered overnight: ativan, nicotine gum, atarax, ibuprofen. Upon approach, patient is walking around the unit. Patient is alert and oriented and engages with interviewer. Patient is cooperative and answers all questions appropriately. She reports feeling as though there is no one there for her, and that her life has not been the same ever since she's had less time with her daughters. She reports having mood swings, which have become worse. She reports either sleeping too much or too little, feels guilty, low energy (but can easily switch to high energy), poor concentration, and suicidal ideation of overdosing on clonidine. She denies AVH. She reports there have been periods of time where she could go 1-3 days without any sleep (while not on any illicit substances).       Charisse Kirkland | Reference #: [id number]    Rx Written	Rx Dispensed	Drug	Quantity	Days Supply	Prescriber Name	Prescriber Aspen #	Payment Method	Dispenser  02/14/2023	02/14/2023	vyvanse 50 mg capsule	30	30	Bhalodi, Celestino	[id number]	Medicaid	[facility] #[id number]  02/13/2023	02/13/2023	buprenorphine-naloxone 2-0.5 mg sl tablet	63	21	Ciarra Jolley MD	[id number]	Medicaid	[facility] #[id number]  02/10/2023	02/10/2023	buprenorphine-naloxone 8-2 mg sl film	3	3	Grady Caldwell	[id number]	Medicaid	[facility] #[id number]  10/07/2022	10/26/2022	dextroamp-amphet er 10 mg cap	30	30	Bhalodi, Celestino	[id number]	[facility] #[id number]  10/07/2022	10/18/2022	vyvanse 50 mg capsule	30	30	Bhalodi, Celestino	[id number]	Medicaid	[facility] #[id number]  09/08/2022	09/26/2022	dextroamp-amphet er 10 mg cap	30	30	Bhalodi, Celestino	[id number]	[facility] #[id number]    Rx Written	Rx Dispensed	Drug	Quantity	Days Supply	Prescriber Name	Prescriber Aspen #	Payment Method	Dispenser  03/16/2023	03/16/2023	buprenorphine-naloxone 2-0.5 mg sl film	90	30	Stephanie Sheth (LIANNA)	[id number]	Medicaid	[facility] #[id number]  03/08/2023	03/08/2023	buprenorphine-naloxone 2-0.5 mg sl film	21	7	Marichuy Thornton	[id number]	Medicaid	[facility] #[id number]  02/23/2023	02/23/2023	buprenorphine-naloxone 2-0.5 mg sl film	14	7	Chica Thornton A, PA	[id number]	Medicaid	[facility] #[id number]

## 2023-04-20 NOTE — BH INPATIENT PSYCHIATRY ASSESSMENT NOTE - OTHER PAST PSYCHIATRIC HISTORY (INCLUDE DETAILS REGARDING ONSET, COURSE OF ILLNESS, INPATIENT/OUTPATIENT TREATMENT)
per HPI Patient reports PPH of anxiety, depression, ADD  Does not currently have a psychiatrist.  No prior IPP hospitalizations.  No prior suicide attempts.

## 2023-04-20 NOTE — BH INPATIENT PSYCHIATRY ASSESSMENT NOTE - RISK ASSESSMENT
Risk factors: substance abuse, impulsivity, limited social supports, psychosocial/interpersonal stressors, legal issues, untreated depression, non-compliance w/ care    protective: children, no past suicidal behaviors, denying any active SI, somewhat motivated to get help Risk factors: substance abuse, impulsivity, limited social supports, psychosocial/interpersonal stressors, legal issues, untreated depression, non-compliance w/ care    protective: children, no previous suicide attempts, denying any active SI, somewhat motivated to get help

## 2023-04-20 NOTE — BH INPATIENT PSYCHIATRY ASSESSMENT NOTE - NSBHMEDSOTHERFT_PSY_A_CORE
Last prescribed (not taking for past 2 weeks):  - Suboxone 2/0.5mg TID  - gabapentin, Buspar, clonidine, Strattera, Wellbutrin Last prescribed Feb 13/14 2023 [facility] [address], [phone]:  - Suboxone 2/0.5mg TID  - gabapentin 800mg BID  - clonidine 0.1mg qd  - vyvanse 50mg qAM  - Wellbutrin XL 300mg qd    Reported by patient, not prescribed to pharmacy:  Buspar 5mg TID  Strattera 40mg qd

## 2023-04-20 NOTE — CONSULT NOTE ADULT - PROBLEM SELECTOR RECOMMENDATION 9
After evaluation at this time ativan prn for withdrawal low risk. Pt should be on Thiamine and Folic acid. Pt will be monitored and supportive care provided.  Use of Vistaril for anxiety.  Consider adding gabapentin for anxiety standing order and follow up with PMD/Program for continuation of treatment.    Abdominal ultrasound AFP every 6 months for HCC screening  -EGD outpatient for esophageal varices screening   -Follow up with Private GI or our [facility] located at [address] [place] [address]. Phone Number: [phone]  -Alcohol counseling provided   CATCH team involved for aftercare and pt will follow up with aftercare
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
## 2023-04-20 NOTE — BH INPATIENT PSYCHIATRY ASSESSMENT NOTE - CURRENT MEDICATION
MEDICATIONS  (STANDING):  buprenorphine 2 mG/naloxone 0.5 mG SL  Tablet 1 Tablet(s) SubLingual <User Schedule>  buPROPion XL (24-Hour) 150 milliGRAM(s) Oral daily  gabapentin 600 milliGRAM(s) Oral daily  nicotine -   7 mG/24Hr(s) Patch 1 Patch Transdermal daily    MEDICATIONS  (PRN):  hydrOXYzine hydrochloride 50 milliGRAM(s) Oral at bedtime PRN sleep  ibuprofen  Tablet. 400 milliGRAM(s) Oral every 6 hours PRN Moderate Pain (4 - 6)  LORazepam     Tablet 0.5 milliGRAM(s) Oral every 6 hours PRN Anxiety  nicotine  Polacrilex Gum 2 milliGRAM(s) Oral every 3 hours PRN smoking cessation   MEDICATIONS  (STANDING):  buPROPion XL (24-Hour) 150 milliGRAM(s) Oral daily  gabapentin 600 milliGRAM(s) Oral daily  nicotine -   7 mG/24Hr(s) Patch 1 Patch Transdermal daily    MEDICATIONS  (PRN):  hydrOXYzine hydrochloride 50 milliGRAM(s) Oral at bedtime PRN sleep  ibuprofen  Tablet. 400 milliGRAM(s) Oral every 6 hours PRN Moderate Pain (4 - 6)  LORazepam     Tablet 0.5 milliGRAM(s) Oral every 6 hours PRN Anxiety  nicotine  Polacrilex Gum 2 milliGRAM(s) Oral every 3 hours PRN smoking cessation

## 2023-04-20 NOTE — BH INPATIENT PSYCHIATRY ASSESSMENT NOTE - PAST PSYCHOTROPIC MEDICATION
Last prescribed (not taking for 2 weeks prior to ED visit):  - Suboxone 2/0.5mg TID  - gabapentin, Buspar, clonidine, Strattera, Wellbutrin    In past, Zoloft, Vyvanse Last prescribed (not taking for 2 weeks prior to ED visit):  - Suboxone 2/0.5mg TID  - gabapentin, Buspar, clonidine, Strattera, Wellbutrin    In past, Zoloft, Vyvanse, topamax, atarax

## 2023-04-20 NOTE — BH INPATIENT PSYCHIATRY ASSESSMENT NOTE - NSBHCHARTREVIEWINVESTIGATE_PSY_A_CORE FT
Ventricular Rate 68 BPM    Atrial Rate 68 BPM    P-R Interval 148 ms    QRS Duration 78 ms    Q-T Interval 436 ms    QTC Calculation(Bazett) 463 ms    P Axis 55 degrees    R Axis 64 degrees    T Axis 67 degrees    Diagnosis Line Normal sinus rhythm  Normal ECG    Confirmed by KAZ HANKS, JOYCE ([id number]) on 4/20/2023 10:43:02 AM

## 2023-04-20 NOTE — BH INPATIENT PSYCHIATRY ASSESSMENT NOTE - NSBHCHARTREVIEWLAB_PSY_A_CORE FT
Lipid Profile (04.20.23 @ 07:53)   Cholesterol, Serum: 180 mg/dL  Triglycerides, Serum: 63 mg/dL  HDL Cholesterol, Serum: 53 mg/dL  Non HDL Cholesterol: 127    Complete Blood Count + Automated Diff (04.17.23 @ 17:00)   WBC Count: 9.40 K/uL  RBC Count: 3.89 M/uL  Hemoglobin: 11.8 g/dL  Hematocrit: 35.2 %  Mean Cell Volume: 90.5 fL  Mean Cell Hemoglobin: 30.3 pg  Mean Cell Hemoglobin Conc: 33.5 g/dL  Red Cell Distrib Width: 12.1 %  Platelet Count - Automated: 291 K/uL  MPV: 10.9 fL  Auto Neutrophil #: 6.61 K/uL  Auto Lymphocyte #: 1.79 K/uL  Auto Monocyte #: 0.59 K/uL  Auto Eosinophil #: 0.35 K/uL  Auto Basophil #: 0.03 K/uL  Auto Neutrophil %: 70.4    Basic Metabolic Panel (04.17.23 @ 17:00)   Sodium, Serum: 141 mmol/L  Potassium, Serum: 3.9 mmol/L  Chloride, Serum: 105 mmol/L  Carbon Dioxide, Serum: 25 mmol/L  Anion Gap, Serum: 11 mmol/L  Blood Urea Nitrogen, Serum: 22 mg/dL  Creatinine, Serum: 0.8 mg/dL  Glucose, Serum: 103 mg/dL  Calcium, Total Serum: 9.5 mg/dL  eGFR: 93

## 2023-04-20 NOTE — BH INPATIENT PSYCHIATRY ASSESSMENT NOTE - NSBHSUBSTUSED_PSY_A_CORE
Alcohol/Amphetamines.../Benzodiazepines/Cannabis/Cocaine/Crack/Heroin/Opiates Alcohol/Amphetamines.../Benzodiazepines/Cannabis/Cocaine/Crack/Hallucinogens/Heroin/Opiates

## 2023-04-20 NOTE — BH INPATIENT PSYCHIATRY ASSESSMENT NOTE - NSICDXPASTMEDICALHX_GEN_ALL_CORE_FT
PAST MEDICAL HISTORY:  Anxiety     Depression     Substance abuse      PAST MEDICAL HISTORY:  Anxiety     Depression     GERD (gastroesophageal reflux disease)     Substance abuse     Type 2 diabetes mellitus

## 2023-04-20 NOTE — BH INPATIENT PSYCHIATRY ASSESSMENT NOTE - NSBHMETABOLIC_PSY_ALL_CORE_FT
BMI: BMI (kg/m2): 27.8 (04-19-23 @ 17:48)  HbA1c:   Glucose:   BP: 111/55 (04-20-23 @ 09:39) (102/68 - 150/76)  Lipid Panel: Date/Time: 04-20-23 @ 07:53  Cholesterol, Serum: 180  Direct LDL: --  HDL Cholesterol, Serum: 53  Total Cholesterol/HDL Ration Measurement: --  Triglycerides, Serum: 63   BMI: BMI (kg/m2): 27.8 (04-19-23 @ 17:48)  HbA1c:   Glucose:   BP: 120/76 (04-20-23 @ 16:03) (102/68 - 150/76)  Lipid Panel: Date/Time: 04-20-23 @ 07:53  Cholesterol, Serum: 180  Direct LDL: --  HDL Cholesterol, Serum: 53  Total Cholesterol/HDL Ration Measurement: --  Triglycerides, Serum: 63
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
## 2023-04-20 NOTE — CONSULT NOTE ADULT - ASSESSMENT
#MDD/anxiety - treatment per psych   #polysubstance abuse hx on suboxone - no signs of WD at this time  #ETOH dependence with suspected thiamine and folate def - replete    recall prn

## 2023-04-21 PROCEDURE — 99231 SBSQ HOSP IP/OBS SF/LOW 25: CPT

## 2023-04-21 RX ORDER — BUPROPION HYDROCHLORIDE 150 MG/1
300 TABLET, EXTENDED RELEASE ORAL DAILY
Refills: 0 | Status: DISCONTINUED | OUTPATIENT
Start: 2023-04-21 | End: 2023-04-25

## 2023-04-21 RX ORDER — TRAZODONE HCL 50 MG
50 TABLET ORAL AT BEDTIME
Refills: 0 | Status: DISCONTINUED | OUTPATIENT
Start: 2023-04-21 | End: 2023-04-23

## 2023-04-21 RX ORDER — ATOMOXETINE HYDROCHLORIDE 10 MG/1
40 CAPSULE ORAL DAILY
Refills: 0 | Status: DISCONTINUED | OUTPATIENT
Start: 2023-04-21 | End: 2023-04-25

## 2023-04-21 RX ORDER — TRAZODONE HCL 50 MG
50 TABLET ORAL AT BEDTIME
Refills: 0 | Status: DISCONTINUED | OUTPATIENT
Start: 2023-04-21 | End: 2023-04-25

## 2023-04-21 RX ORDER — GABAPENTIN 400 MG/1
800 CAPSULE ORAL
Refills: 0 | Status: DISCONTINUED | OUTPATIENT
Start: 2023-04-21 | End: 2023-04-25

## 2023-04-21 RX ADMIN — BUPRENORPHINE AND NALOXONE 1 TABLET(S): 2; .5 TABLET SUBLINGUAL at 15:18

## 2023-04-21 RX ADMIN — Medication 1 PATCH: at 20:07

## 2023-04-21 RX ADMIN — BUPROPION HYDROCHLORIDE 300 MILLIGRAM(S): 150 TABLET, EXTENDED RELEASE ORAL at 12:29

## 2023-04-21 RX ADMIN — Medication 400 MILLIGRAM(S): at 21:57

## 2023-04-21 RX ADMIN — BUPRENORPHINE AND NALOXONE 1 TABLET(S): 2; .5 TABLET SUBLINGUAL at 06:44

## 2023-04-21 RX ADMIN — GABAPENTIN 800 MILLIGRAM(S): 400 CAPSULE ORAL at 20:06

## 2023-04-21 RX ADMIN — Medication 50 MILLIGRAM(S): at 15:51

## 2023-04-21 RX ADMIN — Medication 2 MILLIGRAM(S): at 22:00

## 2023-04-21 RX ADMIN — Medication 1 PATCH: at 12:21

## 2023-04-21 RX ADMIN — Medication 50 MILLIGRAM(S): at 22:48

## 2023-04-21 RX ADMIN — Medication 400 MILLIGRAM(S): at 22:49

## 2023-04-21 RX ADMIN — BUPRENORPHINE AND NALOXONE 1 TABLET(S): 2; .5 TABLET SUBLINGUAL at 22:48

## 2023-04-21 RX ADMIN — Medication 5 MILLIGRAM(S): at 12:21

## 2023-04-21 RX ADMIN — Medication 5 MILLIGRAM(S): at 20:06

## 2023-04-21 NOTE — PROGRESS NOTE ADULT - SUBJECTIVE AND OBJECTIVE BOX
Pt interviewed, examined and EMR chart reviewed.    Follow up of Alcohol Dependency. Pt is on Ativan PRN. Pt is doing well.  Pt with No complaint of withdrawal      REVIEW OF SYSTEMS:    Constitutional: No fever, weight loss or fatigue  ENT:  No difficulty hearing, tinnitus, vertigo; No sinus or throat pain  Neck: No pain or stiffness  Respiratory: No cough, wheezing, chills or hemoptysis  Cardiovascular: No chest pain, palpitations, shortness of breath, dizziness or leg swelling  Gastrointestinal: No abdominal or epigastric pain. No nausea, vomiting or hematemesis; No diarrhea or constipation. No melena or hematochezia.  Neurological: No headaches, memory loss, loss of strength, numbness or tremors  Musculoskeletal: No joint pain or swelling; No muscle, back or extremity pain      MEDICATIONS  (STANDING):  atomoxetine. 40 milliGRAM(s) Oral daily  buprenorphine 2 mG/naloxone 0.5 mG SL  Tablet 1 Tablet(s) SubLingual <User Schedule>  buPROPion XL (24-Hour) 300 milliGRAM(s) Oral daily  busPIRone 5 milliGRAM(s) Oral three times a day  gabapentin 800 milliGRAM(s) Oral two times a day  nicotine -   7 mG/24Hr(s) Patch 1 Patch Transdermal daily  traZODone 50 milliGRAM(s) Oral at bedtime    MEDICATIONS  (PRN):  hydrOXYzine hydrochloride 50 milliGRAM(s) Oral at bedtime PRN sleep  ibuprofen  Tablet. 400 milliGRAM(s) Oral every 6 hours PRN Moderate Pain (4 - 6)  nicotine  Polacrilex Gum 2 milliGRAM(s) Oral every 3 hours PRN smoking cessation  traZODone 50 milliGRAM(s) Oral at bedtime PRN insomnia      Vital Signs Last 24 Hrs  T(C): 36.3 (21 Apr 2023 08:19), Max: 36.3 (21 Apr 2023 08:19)  T(F): 97.4 (21 Apr 2023 08:19), Max: 97.4 (21 Apr 2023 08:19)  HR: 71 (21 Apr 2023 08:19) (71 - 92)  BP: 111/66 (21 Apr 2023 08:19) (111/66 - 120/76)  BP(mean): --  RR: 18 (21 Apr 2023 08:19) (16 - 18)  SpO2: --        PHYSICAL EXAM:    Constitutional: NAD, well-groomed, well-developed  HEENT: PERRLA, EOMI, Normal Hearing, MMM  Neck: No LAD, No JVD  Back: Normal spine flexure, No CVA tenderness  Respiratory: CTAB/L  Cardiovascular: S1 and S2, RRR, no M/G/R  Gastrointestinal: BS+, soft, NT/ND  Extremities: No peripheral edema  Vascular: 2+ peripheral pulses  Neurological: A/O x 3, no focal deficits    LABS:              Drug Screen Urine:  Alcohol Level        RADIOLOGY & ADDITIONAL STUDIES:

## 2023-04-21 NOTE — BH TREATMENT PLAN - NSTXPLANTHERAPYSESSIONSFT_PSY_ALL_CORE
04-20-23  Type of therapy: Coping skills  Type of session: Group  Level of patient participation: Engaged  Duration of participation: 30 minutes  Therapy conducted by: Social work  Therapy Summary: Group session facilitated by MERLINE fischer. In a game-oriented setting, questions such as “What activities make you feel better?” and “I feel the most stressed when?” were explored to help increase feelings of self-efficacy through an interactive psychoeducational exercise.     Tova was an active and engaged participant in the group discussions promoted by the activity.    04-20-23  Type of therapy: Coping skills,Creative arts therapy,Mindfulness,Stress management  Type of session: Group  Level of patient participation: Engaged,Participates  Duration of participation: 45 minutes  Therapy conducted by: Psych rehab  Therapy Summary: Pt attended group , engaged in an art project, social with peers . Pt was calm and cooperative .    04-21-23  Type of therapy: Dialectical behavior therapy,Coping skills  Type of session: Group  Level of patient participation: Attentive,Engaged,Participated with encouragement  Duration of participation: 45 minutes  Therapy conducted by: Social work  Therapy Summary: Patient attended the Crisis Skills Group with  and peers. The STOP Skill was reviewed which is a distress tolerance DBT Skill that helps one respond to stressors by staying in control of emotions and not acting solely on impulse. The following steps were reviewed: “Stop, Take a step back, Observe, Proceed effectively”.     Tova was an active listener and participated with encouragement. She appeared open to the therapeutic intervention. She is encouraged to attend future sessions.

## 2023-04-21 NOTE — PROGRESS NOTE ADULT - PROBLEM SELECTOR PLAN 1
After evaluation at this time ativan prn for withdrawal low risk. Pt has not received any doses. Can continue on PRN    Pt should be on Thiamine and Folic acid. Pt will be monitored and supportive care provided.  Use of Vistaril for anxiety.  Consider adding gabapentin for anxiety standing order and follow up with PMD/Program for continuation of treatment.    Abdominal ultrasound AFP every 6 months for HCC screening  -EGD outpatient for esophageal varices screening   -Follow up with Private GI or our [facility] located at [address] [place] [address]. Phone Number: [phone]  -Alcohol counseling provided   CATCH team involved for aftercare and pt will follow up with aftercare.

## 2023-04-21 NOTE — BH TREATMENT PLAN - NSTXDCOPLKINTERSW_PSY_ALL_CORE
SW will meet with patient to provide support, education, collateral and referrals. Patient is requesting inpatient rehab for substance use and has been referred to the CATCH team

## 2023-04-21 NOTE — BH TREATMENT PLAN - NSTXSUICIDINTERRN_PSY_ALL_CORE
RN to assess patients behavior and be alert for increased signs of impulsivity/agitation.  RN will monitor patient and provide support and comfort and provedie safety on unit.    RN to encourage medication compliance and provide support and education as needed on Dx, coping skills, medication, and safety planning.  RN to Encourage daily ADL's  RN to Encourage group attendance  RN will assess and intervene for any suicidal ideations

## 2023-04-22 PROCEDURE — 99232 SBSQ HOSP IP/OBS MODERATE 35: CPT | Mod: GC

## 2023-04-22 RX ORDER — POLYETHYLENE GLYCOL 3350 17 G/17G
17 POWDER, FOR SOLUTION ORAL DAILY
Refills: 0 | Status: DISCONTINUED | OUTPATIENT
Start: 2023-04-22 | End: 2023-04-25

## 2023-04-22 RX ORDER — TOPIRAMATE 25 MG
25 TABLET ORAL DAILY
Refills: 0 | Status: DISCONTINUED | OUTPATIENT
Start: 2023-04-22 | End: 2023-04-25

## 2023-04-22 RX ORDER — BUPRENORPHINE AND NALOXONE 2; .5 MG/1; MG/1
1 TABLET SUBLINGUAL
Refills: 0 | Status: DISCONTINUED | OUTPATIENT
Start: 2023-04-22 | End: 2023-04-25

## 2023-04-22 RX ORDER — NICOTINE POLACRILEX 2 MG
1 GUM BUCCAL DAILY
Refills: 0 | Status: DISCONTINUED | OUTPATIENT
Start: 2023-04-22 | End: 2023-04-25

## 2023-04-22 RX ADMIN — BUPRENORPHINE AND NALOXONE 1 TABLET(S): 2; .5 TABLET SUBLINGUAL at 20:08

## 2023-04-22 RX ADMIN — Medication 1 PATCH: at 14:27

## 2023-04-22 RX ADMIN — BUPRENORPHINE AND NALOXONE 1 TABLET(S): 2; .5 TABLET SUBLINGUAL at 14:20

## 2023-04-22 RX ADMIN — GABAPENTIN 800 MILLIGRAM(S): 400 CAPSULE ORAL at 08:18

## 2023-04-22 RX ADMIN — Medication 400 MILLIGRAM(S): at 19:04

## 2023-04-22 RX ADMIN — Medication 2 MILLIGRAM(S): at 17:58

## 2023-04-22 RX ADMIN — Medication 1 PATCH: at 05:57

## 2023-04-22 RX ADMIN — Medication 400 MILLIGRAM(S): at 04:34

## 2023-04-22 RX ADMIN — Medication 2 MILLIGRAM(S): at 23:08

## 2023-04-22 RX ADMIN — BUPRENORPHINE AND NALOXONE 1 TABLET(S): 2; .5 TABLET SUBLINGUAL at 05:57

## 2023-04-22 RX ADMIN — Medication 1 PATCH: at 18:03

## 2023-04-22 RX ADMIN — Medication 25 MILLIGRAM(S): at 14:20

## 2023-04-22 RX ADMIN — Medication 2 MILLIGRAM(S): at 10:38

## 2023-04-22 RX ADMIN — Medication 400 MILLIGRAM(S): at 05:15

## 2023-04-22 RX ADMIN — Medication 2 MILLIGRAM(S): at 04:34

## 2023-04-22 RX ADMIN — Medication 1 PATCH: at 08:24

## 2023-04-22 RX ADMIN — Medication 400 MILLIGRAM(S): at 17:58

## 2023-04-22 RX ADMIN — Medication 1 PATCH: at 14:20

## 2023-04-22 RX ADMIN — Medication 1 PATCH: at 08:18

## 2023-04-22 RX ADMIN — Medication 5 MILLIGRAM(S): at 08:18

## 2023-04-22 RX ADMIN — Medication 50 MILLIGRAM(S): at 20:33

## 2023-04-22 RX ADMIN — BUPROPION HYDROCHLORIDE 300 MILLIGRAM(S): 150 TABLET, EXTENDED RELEASE ORAL at 08:17

## 2023-04-22 RX ADMIN — Medication 5 MILLIGRAM(S): at 13:12

## 2023-04-22 RX ADMIN — GABAPENTIN 800 MILLIGRAM(S): 400 CAPSULE ORAL at 20:08

## 2023-04-23 PROCEDURE — 99231 SBSQ HOSP IP/OBS SF/LOW 25: CPT

## 2023-04-23 RX ORDER — TRAZODONE HCL 50 MG
100 TABLET ORAL AT BEDTIME
Refills: 0 | Status: DISCONTINUED | OUTPATIENT
Start: 2023-04-23 | End: 2023-04-25

## 2023-04-23 RX ORDER — PANTOPRAZOLE SODIUM 20 MG/1
40 TABLET, DELAYED RELEASE ORAL
Refills: 0 | Status: DISCONTINUED | OUTPATIENT
Start: 2023-04-23 | End: 2023-04-25

## 2023-04-23 RX ADMIN — Medication 1 PATCH: at 08:31

## 2023-04-23 RX ADMIN — Medication 1 PATCH: at 18:58

## 2023-04-23 RX ADMIN — POLYETHYLENE GLYCOL 3350 17 GRAM(S): 17 POWDER, FOR SOLUTION ORAL at 09:34

## 2023-04-23 RX ADMIN — Medication 1 PATCH: at 08:43

## 2023-04-23 RX ADMIN — Medication 25 MILLIGRAM(S): at 08:31

## 2023-04-23 RX ADMIN — Medication 2 MILLIGRAM(S): at 09:34

## 2023-04-23 RX ADMIN — Medication 400 MILLIGRAM(S): at 13:42

## 2023-04-23 RX ADMIN — GABAPENTIN 800 MILLIGRAM(S): 400 CAPSULE ORAL at 20:18

## 2023-04-23 RX ADMIN — Medication 50 MILLIGRAM(S): at 01:46

## 2023-04-23 RX ADMIN — Medication 100 MILLIGRAM(S): at 20:18

## 2023-04-23 RX ADMIN — BUPRENORPHINE AND NALOXONE 1 TABLET(S): 2; .5 TABLET SUBLINGUAL at 06:34

## 2023-04-23 RX ADMIN — Medication 400 MILLIGRAM(S): at 06:50

## 2023-04-23 RX ADMIN — Medication 400 MILLIGRAM(S): at 14:12

## 2023-04-23 RX ADMIN — BUPRENORPHINE AND NALOXONE 1 TABLET(S): 2; .5 TABLET SUBLINGUAL at 13:41

## 2023-04-23 RX ADMIN — BUPROPION HYDROCHLORIDE 300 MILLIGRAM(S): 150 TABLET, EXTENDED RELEASE ORAL at 08:30

## 2023-04-23 RX ADMIN — BUPRENORPHINE AND NALOXONE 1 TABLET(S): 2; .5 TABLET SUBLINGUAL at 20:18

## 2023-04-23 RX ADMIN — GABAPENTIN 800 MILLIGRAM(S): 400 CAPSULE ORAL at 08:30

## 2023-04-23 RX ADMIN — Medication 30 MILLILITER(S): at 12:15

## 2023-04-23 RX ADMIN — Medication 400 MILLIGRAM(S): at 00:18

## 2023-04-24 LAB
AMPHET UR QL SCN: 1440 NG/ML — SIGNIFICANT CHANGE UP
AMPHET UR QL SCN: POSITIVE
AMPHETAMINE IN-HOUSE INTERPRETATION: POSITIVE
AMPHETAMINE, UR RESULT: 1440 NG/ML — SIGNIFICANT CHANGE UP
BENZOYLEGONINE, UR RESULT: SIGNIFICANT CHANGE UP NG/ML
BZE UR QL SCN: SIGNIFICANT CHANGE UP NG/ML
COCAINE IN-HOUSE INTERPRETATION: POSITIVE
COCAINE UR QL SCN: POSITIVE
MDA UR QL SCN: NEGATIVE NG/ML — SIGNIFICANT CHANGE UP
MDA, UR RESULT: NEGATIVE NG/ML — SIGNIFICANT CHANGE UP
MDEA, UR RESULT: NEGATIVE NG/ML — SIGNIFICANT CHANGE UP
MDMA UR QL SCN: NEGATIVE NG/ML — SIGNIFICANT CHANGE UP
MDMA, UR RESULT: NEGATIVE NG/ML — SIGNIFICANT CHANGE UP
METHAMPHET UR QL SCN: 5427 NG/ML — SIGNIFICANT CHANGE UP
METHAMPHETAMINE, UR RESULT: 5427 NG/ML — SIGNIFICANT CHANGE UP

## 2023-04-24 PROCEDURE — 99231 SBSQ HOSP IP/OBS SF/LOW 25: CPT

## 2023-04-24 RX ORDER — LANOLIN ALCOHOL/MO/W.PET/CERES
3 CREAM (GRAM) TOPICAL AT BEDTIME
Refills: 0 | Status: DISCONTINUED | OUTPATIENT
Start: 2023-04-24 | End: 2023-04-25

## 2023-04-24 RX ORDER — BUPRENORPHINE AND NALOXONE 2; .5 MG/1; MG/1
6 TABLET SUBLINGUAL
Qty: 0 | Refills: 0 | DISCHARGE

## 2023-04-24 RX ORDER — GABAPENTIN 400 MG/1
1 CAPSULE ORAL
Qty: 28 | Refills: 0
Start: 2023-04-24 | End: 2023-05-07

## 2023-04-24 RX ORDER — PANTOPRAZOLE SODIUM 20 MG/1
1 TABLET, DELAYED RELEASE ORAL
Qty: 0 | Refills: 0 | DISCHARGE
Start: 2023-04-24

## 2023-04-24 RX ORDER — ATOMOXETINE HYDROCHLORIDE 10 MG/1
1 CAPSULE ORAL
Qty: 14 | Refills: 0
Start: 2023-04-24 | End: 2023-05-07

## 2023-04-24 RX ORDER — TOPIRAMATE 25 MG
1 TABLET ORAL
Qty: 0 | Refills: 0 | DISCHARGE
Start: 2023-04-24

## 2023-04-24 RX ORDER — BUPRENORPHINE AND NALOXONE 2; .5 MG/1; MG/1
1 TABLET SUBLINGUAL
Refills: 0 | DISCHARGE

## 2023-04-24 RX ORDER — ATOMOXETINE HYDROCHLORIDE 10 MG/1
1 CAPSULE ORAL
Qty: 0 | Refills: 0 | DISCHARGE
Start: 2023-04-24

## 2023-04-24 RX ORDER — BUPROPION HYDROCHLORIDE 150 MG/1
1 TABLET, EXTENDED RELEASE ORAL
Qty: 0 | Refills: 0 | DISCHARGE
Start: 2023-04-24

## 2023-04-24 RX ORDER — TRAZODONE HCL 50 MG
1 TABLET ORAL
Qty: 0 | Refills: 0 | DISCHARGE
Start: 2023-04-24

## 2023-04-24 RX ORDER — BUPRENORPHINE AND NALOXONE 2; .5 MG/1; MG/1
1 TABLET SUBLINGUAL
Qty: 5 | Refills: 0
Start: 2023-04-24 | End: 2023-05-07

## 2023-04-24 RX ORDER — BUPRENORPHINE AND NALOXONE 2; .5 MG/1; MG/1
1 TABLET SUBLINGUAL
Qty: 0 | Refills: 0 | DISCHARGE
Start: 2023-04-24

## 2023-04-24 RX ORDER — HYDROXYZINE HCL 10 MG
1 TABLET ORAL
Qty: 0 | Refills: 0 | DISCHARGE
Start: 2023-04-24

## 2023-04-24 RX ORDER — BUPROPION HYDROCHLORIDE 150 MG/1
300 TABLET, EXTENDED RELEASE ORAL
Qty: 0 | Refills: 0 | DISCHARGE

## 2023-04-24 RX ORDER — ATOMOXETINE HYDROCHLORIDE 10 MG/1
40 CAPSULE ORAL ONCE
Refills: 0 | Status: COMPLETED | OUTPATIENT
Start: 2023-04-24 | End: 2023-04-24

## 2023-04-24 RX ORDER — HYDROXYZINE HCL 10 MG
1 TABLET ORAL
Qty: 14 | Refills: 0
Start: 2023-04-24 | End: 2023-05-07

## 2023-04-24 RX ORDER — TRAZODONE HCL 50 MG
1 TABLET ORAL
Qty: 14 | Refills: 0
Start: 2023-04-24 | End: 2023-05-07

## 2023-04-24 RX ORDER — TOPIRAMATE 25 MG
1 TABLET ORAL
Qty: 14 | Refills: 0
Start: 2023-04-24 | End: 2023-05-07

## 2023-04-24 RX ORDER — BUPROPION HYDROCHLORIDE 150 MG/1
1 TABLET, EXTENDED RELEASE ORAL
Qty: 14 | Refills: 0
Start: 2023-04-24 | End: 2023-05-07

## 2023-04-24 RX ORDER — GABAPENTIN 400 MG/1
1 CAPSULE ORAL
Qty: 0 | Refills: 0 | DISCHARGE
Start: 2023-04-24

## 2023-04-24 RX ORDER — NICOTINE POLACRILEX 2 MG
1 GUM BUCCAL
Qty: 1 | Refills: 0
Start: 2023-04-24 | End: 2023-05-07

## 2023-04-24 RX ORDER — NICOTINE POLACRILEX 2 MG
1 GUM BUCCAL
Qty: 0 | Refills: 0 | DISCHARGE
Start: 2023-04-24

## 2023-04-24 RX ADMIN — ATOMOXETINE HYDROCHLORIDE 40 MILLIGRAM(S): 10 CAPSULE ORAL at 16:24

## 2023-04-24 RX ADMIN — Medication 2 MILLIGRAM(S): at 20:31

## 2023-04-24 RX ADMIN — Medication 1 PATCH: at 07:22

## 2023-04-24 RX ADMIN — Medication 100 MILLIGRAM(S): at 23:00

## 2023-04-24 RX ADMIN — BUPRENORPHINE AND NALOXONE 1 TABLET(S): 2; .5 TABLET SUBLINGUAL at 22:36

## 2023-04-24 RX ADMIN — Medication 25 MILLIGRAM(S): at 08:05

## 2023-04-24 RX ADMIN — BUPROPION HYDROCHLORIDE 300 MILLIGRAM(S): 150 TABLET, EXTENDED RELEASE ORAL at 08:05

## 2023-04-24 RX ADMIN — Medication 1 PATCH: at 20:33

## 2023-04-24 RX ADMIN — Medication 400 MILLIGRAM(S): at 09:30

## 2023-04-24 RX ADMIN — Medication 1 PATCH: at 09:30

## 2023-04-24 RX ADMIN — BUPRENORPHINE AND NALOXONE 1 TABLET(S): 2; .5 TABLET SUBLINGUAL at 14:53

## 2023-04-24 RX ADMIN — PANTOPRAZOLE SODIUM 40 MILLIGRAM(S): 20 TABLET, DELAYED RELEASE ORAL at 06:34

## 2023-04-24 RX ADMIN — Medication 30 MILLILITER(S): at 22:36

## 2023-04-24 RX ADMIN — Medication 400 MILLIGRAM(S): at 12:29

## 2023-04-24 RX ADMIN — Medication 3 MILLIGRAM(S): at 23:00

## 2023-04-24 RX ADMIN — GABAPENTIN 800 MILLIGRAM(S): 400 CAPSULE ORAL at 08:05

## 2023-04-24 RX ADMIN — GABAPENTIN 800 MILLIGRAM(S): 400 CAPSULE ORAL at 22:36

## 2023-04-24 RX ADMIN — BUPRENORPHINE AND NALOXONE 1 TABLET(S): 2; .5 TABLET SUBLINGUAL at 06:17

## 2023-04-24 NOTE — BH INPATIENT PSYCHIATRY PROGRESS NOTE - NSBHASSESSSUMMFT_PSY_ALL_CORE
Tova Davis is a 43 yo  woman, resides at [facility] ([facility]) for past 6 months, previously living with boyfriend of 7 years, has two kids ages 12 and 9 under care of her sister, unemployed, with no significant PMH, and past psych hx of polysubstance abuse (opioid, cannabis, etoh, methamphetamine, cocaine), on Suboxone, recently discharged from inpatient rehab 2-3 weeks ago, non-compliant w/ outpatient care since then, otherwise no previous psychiatric hospitalizations, no history of suicide attempts, who was BIB police under arrest after visiting her boyfriend and violating order of protection. Psychiatry was consulted as patient is endorsing SI.    Patient endorsed feelings of hopelessness, reported she had suicidal ideation to overdose on clonidine. Currently denies any suicidal ideation. She continues to express some depressive symptoms and would benefit from continued inpatient psychiatric hospitalization for symptom and medication management and stabilization.     4-21-23: medications changed to home doses as confirmed from [facility] (discharged 3/23/23)    #Unspecified mood disorder  - increased Wellbutrin XL 150mg to 300mg qd  - restarted patient on buspar 5mg TID  - started patient on trazodone 50mg qhs    #ADHD  - patient on Strattera 40mg qd-- pharmacy will not have until Monday 4/24    #Opioid use disorder  - c/w suboxone 2mg TID  - increased gabapentin 600mg qd to 800mg BID  - reports clonidine 0.1mg qd-- latest BP 4/20 120/76-- pt not discharged on this medication from [facility]  
Tova Davis is a 43 yo  woman, resides at [facility] ([facility]) for past 6 months, previously living with boyfriend of 7 years, has two kids ages 12 and 9 under care of her sister, unemployed, with no significant PMH, and past psych hx of polysubstance abuse (opioid, cannabis, etoh, methamphetamine, cocaine), on Suboxone, recently discharged from inpatient rehab 2-3 weeks ago, non-compliant w/ outpatient care since then, otherwise no previous psychiatric hospitalizations, no history of suicide attempts, who was BIB police under arrest after visiting her boyfriend and violating order of protection. Psychiatry was consulted as patient is endorsing SI.    Patient endorsed feelings of hopelessness, reported she had suicidal ideation to overdose on clonidine. Currently denies any suicidal ideation. She continues to express some depressive symptoms and would benefit from continued inpatient psychiatric hospitalization for symptom and medication management and stabilization.     4-21-23: medications changed to home doses as confirmed from [facility] (discharged 3/23/23)  4-22-23: start topiramate 25mg qd   4-23-23: increase trazodone 50mg qhs to 100mg qhs    #Unspecified mood disorder  - increased Wellbutrin XL 150mg to 300mg qd  - restarted patient on buspar 5mg TID  - c/w trazodone 100mg qhs (increased to 100mg 4/23)  - c/w Topamax 25mg daily     #ADHD  - patient on Strattera 40mg qd-- pharmacy will not have until Monday 4/24    #Opioid use disorder  - c/w suboxone 2mg TID  - increased gabapentin 600mg qd to 800mg BID  - reports clonidine 0.1mg qd-- latest BP 4/20 120/76-- pt not discharged on this medication from [facility]
Tova Davis is a 41 yo  woman, resides at [facility] ([facility]) for past 6 months, previously living with boyfriend of 7 years, has two kids ages 12 and 9 under care of her sister, unemployed, with no significant PMH, and past psych hx of polysubstance abuse (opioid, cannabis, etoh, methamphetamine, cocaine), on Suboxone, recently discharged from inpatient rehab 2-3 weeks ago, non-compliant w/ outpatient care since then, otherwise no previous psychiatric hospitalizations, no history of suicide attempts, who was BIB police under arrest after visiting her boyfriend and violating order of protection. Psychiatry was consulted as patient is endorsing SI.    Patient reports feeling somewhat better today though is having cravings for cocaine and describes insomnia x 2 days and increased anxiety. Currently denies any suicidal ideation. Would benefit from continued inpatient psychiatric hospitalization for symptom and medication management and stabilization.     4-21-23: medications changed to home doses as confirmed from [facility] (discharged 3/23/23)    #Unspecified mood disorder  - continue Wellbutrin XL 300mg qd  - buspar 5mg TID discontinued  - continue trazodone 50mg qhs with trazodone 50 mg prn qhs if 50 mg is not sufficient to aid with insomnia    #Opioid use disorder  - c/w suboxone 2mg TID  - continue gabapentin 800mg BID    #cocaine use disorder  - pt started on topamax 25 mg daily with recommendation to increase gradually in 25 or 50 mg increments weekly to max dose of 300 mg daily. Doses >50 mg/day should be administered in 2 divided doses. Topamax to aid with craving for cocaine, alcohol     #ADHD  - patient on Strattera 40mg qd-- pharmacy will not have until Monday 4/24    
Ms Davis is a 42 year old woman, with a history of Opioid use  on Suboxone and other substance use disorder who was admitted to the inpatient psychiatric floor for worsening suicidal ideations.   patient appears to be less depressed and denies current suicidal ideations , intent or plans . She however continues to have distressing insomnia .   At this time, patient continues to be considered a danger to herself and continues to need inpatient psychiatric hospitalization for medication management and symptom stabilization. We recommend increasing trazodone to 100mg p.o bedtime but will continue other psychotropic medications as prescribed .     Plan  1. increase to trazodone 100mg p.o bedtime   2. Continue other psychotropic medications :   Wellbutrin XL 300mg p.o daily   Gabapentin 800mg P.o BID   Topamax 25mg p.o daily , will require titrating for better control of her mood   3. continue Suboxone 2/0.5mg SL TID

## 2023-04-24 NOTE — BH INPATIENT PSYCHIATRY PROGRESS NOTE - NSICDXBHPRIMARYDX_PSY_ALL_CORE
MDD (major depressive disorder)   F32.9  

## 2023-04-24 NOTE — BH INPATIENT PSYCHIATRY PROGRESS NOTE - NSBHATTESTTYPEVISIT_PSY_A_CORE
Attending Only
Attending with Resident/Fellow/Student

## 2023-04-24 NOTE — BH DISCHARGE NOTE NURSING/SOCIAL WORK/PSYCH REHAB - NSDCPEWEB_GEN_ALL_CORE
[facility] for Tobacco Control website --- [email or web address] --- [email or web address]

## 2023-04-24 NOTE — BH INPATIENT PSYCHIATRY PROGRESS NOTE - NSBHMSETHTPROC_PSY_A_CORE
Linear/Overinclusive/Normal reasoning
Linear/Normal reasoning
Linear/Overinclusive/Normal reasoning
Linear/Overinclusive/Normal reasoning

## 2023-04-24 NOTE — BH INPATIENT PSYCHIATRY PROGRESS NOTE - NSBHCHARTREVIEWVS_PSY_A_CORE FT
Vital Signs Last 24 Hrs  T(C): 35.6 (04-22-23 @ 09:50), Max: 36.8 (04-21-23 @ 15:39)  T(F): 96 (04-22-23 @ 09:50), Max: 98.2 (04-21-23 @ 15:39)  HR: 75 (04-22-23 @ 09:50) (75 - 91)  BP: 93/48 (04-22-23 @ 09:50) (93/48 - 126/65)  BP(mean): --  RR: 18 (04-22-23 @ 09:50) (18 - 18)  SpO2: --    
Vital Signs Last 24 Hrs  T(C): 35.7 (04-23-23 @ 15:41), Max: 36.8 (04-23-23 @ 05:45)  T(F): 96.3 (04-23-23 @ 15:41), Max: 98.3 (04-23-23 @ 05:45)  HR: 70 (04-23-23 @ 15:41) (70 - 73)  BP: 100/67 (04-23-23 @ 15:41) (100/67 - 103/56)  BP(mean): --  RR: 16 (04-23-23 @ 15:41) (16 - 16)  SpO2: --    
Vital Signs Last 24 Hrs  T(C): 36.3 (04-21-23 @ 08:19), Max: 36.3 (04-21-23 @ 08:19)  T(F): 97.4 (04-21-23 @ 08:19), Max: 97.4 (04-21-23 @ 08:19)  HR: 71 (04-21-23 @ 08:19) (71 - 92)  BP: 111/66 (04-21-23 @ 08:19) (111/66 - 120/76)  BP(mean): --  RR: 18 (04-21-23 @ 08:19) (16 - 18)  SpO2: --    
Vital Signs Last 24 Hrs  T(C): 35.8 (04-24-23 @ 07:38), Max: 36.3 (04-24-23 @ 06:18)  T(F): 96.4 (04-24-23 @ 07:38), Max: 97.3 (04-24-23 @ 06:18)  HR: 84 (04-24-23 @ 07:38) (70 - 93)  BP: 136/65 (04-24-23 @ 07:38) (100/67 - 136/65)  BP(mean): --  RR: 19 (04-24-23 @ 06:18) (16 - 19)  SpO2: --

## 2023-04-24 NOTE — BH INPATIENT PSYCHIATRY PROGRESS NOTE - CURRENT MEDICATION
MEDICATIONS  (STANDING):  atomoxetine. 40 milliGRAM(s) Oral daily  buprenorphine 2 mG/naloxone 0.5 mG SL  Tablet 1 Tablet(s) SubLingual <User Schedule>  buPROPion XL (24-Hour) 300 milliGRAM(s) Oral daily  gabapentin 800 milliGRAM(s) Oral two times a day  pantoprazole    Tablet 40 milliGRAM(s) Oral before breakfast  topiramate 25 milliGRAM(s) Oral daily  traZODone 100 milliGRAM(s) Oral at bedtime    MEDICATIONS  (PRN):  aluminum hydroxide/magnesium hydroxide/simethicone Suspension 30 milliLiter(s) Oral every 6 hours PRN Dyspepsia  hydrOXYzine hydrochloride 50 milliGRAM(s) Oral at bedtime PRN sleep  ibuprofen  Tablet. 400 milliGRAM(s) Oral every 6 hours PRN Moderate Pain (4 - 6)  nicotine  Polacrilex Gum 2 milliGRAM(s) Oral every 3 hours PRN smoking cessation  nicotine - 21 mG/24Hr(s) Patch 1 Patch Transdermal daily PRN nicotine dependence w/o withdrawal  polyethylene glycol 3350 17 Gram(s) Oral daily PRN constipation  traZODone 50 milliGRAM(s) Oral at bedtime PRN insomnia  
MEDICATIONS  (STANDING):  atomoxetine. 40 milliGRAM(s) Oral daily  buprenorphine 2 mG/naloxone 0.5 mG SL  Tablet 1 Tablet(s) SubLingual <User Schedule>  buPROPion XL (24-Hour) 300 milliGRAM(s) Oral daily  busPIRone 5 milliGRAM(s) Oral three times a day  gabapentin 800 milliGRAM(s) Oral two times a day  nicotine -   7 mG/24Hr(s) Patch 1 Patch Transdermal daily  traZODone 50 milliGRAM(s) Oral at bedtime    MEDICATIONS  (PRN):  hydrOXYzine hydrochloride 50 milliGRAM(s) Oral at bedtime PRN sleep  ibuprofen  Tablet. 400 milliGRAM(s) Oral every 6 hours PRN Moderate Pain (4 - 6)  nicotine  Polacrilex Gum 2 milliGRAM(s) Oral every 3 hours PRN smoking cessation  traZODone 50 milliGRAM(s) Oral at bedtime PRN insomnia  
MEDICATIONS  (STANDING):  atomoxetine. 40 milliGRAM(s) Oral daily  buprenorphine 2 mG/naloxone 0.5 mG SL  Tablet 1 Tablet(s) SubLingual <User Schedule>  buPROPion XL (24-Hour) 300 milliGRAM(s) Oral daily  gabapentin 800 milliGRAM(s) Oral two times a day  pantoprazole    Tablet 40 milliGRAM(s) Oral before breakfast  topiramate 25 milliGRAM(s) Oral daily  traZODone 100 milliGRAM(s) Oral at bedtime    MEDICATIONS  (PRN):  aluminum hydroxide/magnesium hydroxide/simethicone Suspension 30 milliLiter(s) Oral every 6 hours PRN Dyspepsia  artificial  tears Solution 1 Drop(s) Both EYES daily PRN dry eyes  hydrOXYzine hydrochloride 50 milliGRAM(s) Oral at bedtime PRN sleep  ibuprofen  Tablet. 400 milliGRAM(s) Oral every 6 hours PRN Moderate Pain (4 - 6)  nicotine  Polacrilex Gum 2 milliGRAM(s) Oral every 3 hours PRN smoking cessation  nicotine - 21 mG/24Hr(s) Patch 1 Patch Transdermal daily PRN nicotine dependence w/o withdrawal  polyethylene glycol 3350 17 Gram(s) Oral daily PRN constipation  traZODone 50 milliGRAM(s) Oral at bedtime PRN insomnia  
MEDICATIONS  (STANDING):  atomoxetine. 40 milliGRAM(s) Oral daily  buprenorphine 2 mG/naloxone 0.5 mG SL  Tablet 1 Tablet(s) SubLingual <User Schedule>  buPROPion XL (24-Hour) 300 milliGRAM(s) Oral daily  gabapentin 800 milliGRAM(s) Oral two times a day  topiramate 25 milliGRAM(s) Oral daily  traZODone 50 milliGRAM(s) Oral at bedtime    MEDICATIONS  (PRN):  hydrOXYzine hydrochloride 50 milliGRAM(s) Oral at bedtime PRN sleep  ibuprofen  Tablet. 400 milliGRAM(s) Oral every 6 hours PRN Moderate Pain (4 - 6)  nicotine  Polacrilex Gum 2 milliGRAM(s) Oral every 3 hours PRN smoking cessation  nicotine - 21 mG/24Hr(s) Patch 1 Patch Transdermal daily PRN nicotine dependence w/o withdrawal  polyethylene glycol 3350 17 Gram(s) Oral daily PRN constipation  traZODone 50 milliGRAM(s) Oral at bedtime PRN insomnia

## 2023-04-24 NOTE — BH INPATIENT PSYCHIATRY PROGRESS NOTE - PRN MEDS
MEDICATIONS  (PRN):  hydrOXYzine hydrochloride 50 milliGRAM(s) Oral at bedtime PRN sleep  ibuprofen  Tablet. 400 milliGRAM(s) Oral every 6 hours PRN Moderate Pain (4 - 6)  nicotine  Polacrilex Gum 2 milliGRAM(s) Oral every 3 hours PRN smoking cessation  traZODone 50 milliGRAM(s) Oral at bedtime PRN insomnia  
MEDICATIONS  (PRN):  aluminum hydroxide/magnesium hydroxide/simethicone Suspension 30 milliLiter(s) Oral every 6 hours PRN Dyspepsia  hydrOXYzine hydrochloride 50 milliGRAM(s) Oral at bedtime PRN sleep  ibuprofen  Tablet. 400 milliGRAM(s) Oral every 6 hours PRN Moderate Pain (4 - 6)  nicotine  Polacrilex Gum 2 milliGRAM(s) Oral every 3 hours PRN smoking cessation  nicotine - 21 mG/24Hr(s) Patch 1 Patch Transdermal daily PRN nicotine dependence w/o withdrawal  polyethylene glycol 3350 17 Gram(s) Oral daily PRN constipation  traZODone 50 milliGRAM(s) Oral at bedtime PRN insomnia  
MEDICATIONS  (PRN):  hydrOXYzine hydrochloride 50 milliGRAM(s) Oral at bedtime PRN sleep  ibuprofen  Tablet. 400 milliGRAM(s) Oral every 6 hours PRN Moderate Pain (4 - 6)  nicotine  Polacrilex Gum 2 milliGRAM(s) Oral every 3 hours PRN smoking cessation  nicotine - 21 mG/24Hr(s) Patch 1 Patch Transdermal daily PRN nicotine dependence w/o withdrawal  polyethylene glycol 3350 17 Gram(s) Oral daily PRN constipation  traZODone 50 milliGRAM(s) Oral at bedtime PRN insomnia  
MEDICATIONS  (PRN):  aluminum hydroxide/magnesium hydroxide/simethicone Suspension 30 milliLiter(s) Oral every 6 hours PRN Dyspepsia  artificial  tears Solution 1 Drop(s) Both EYES daily PRN dry eyes  hydrOXYzine hydrochloride 50 milliGRAM(s) Oral at bedtime PRN sleep  ibuprofen  Tablet. 400 milliGRAM(s) Oral every 6 hours PRN Moderate Pain (4 - 6)  nicotine  Polacrilex Gum 2 milliGRAM(s) Oral every 3 hours PRN smoking cessation  nicotine - 21 mG/24Hr(s) Patch 1 Patch Transdermal daily PRN nicotine dependence w/o withdrawal  polyethylene glycol 3350 17 Gram(s) Oral daily PRN constipation  traZODone 50 milliGRAM(s) Oral at bedtime PRN insomnia

## 2023-04-24 NOTE — BH DISCHARGE NOTE NURSING/SOCIAL WORK/PSYCH REHAB - NSDCPEHOTLINE_GEN_ALL_CORE
[facility] Smokers Quitline [phone] ([phone])
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
## 2023-04-24 NOTE — BH INPATIENT PSYCHIATRY DISCHARGE NOTE - HPI (INCLUDE ILLNESS QUALITY, SEVERITY, DURATION, TIMING, CONTEXT, MODIFYING FACTORS, ASSOCIATED SIGNS AND SYMPTOMS)
Tova Davis is a 43 yo  woman, resides at [facility] ([facility]) for past 6 months, previously living with boyfriend of 7 years, has two kids ages 12 and 9 under care of her sister, unemployed, with no significant PMH, and past psych hx of polysubstance abuse (opioid, cannabis, etoh, methamphetamine, cocaine), on Suboxone, recently discharged from inpatient rehab 2-3 weeks ago, non-compliant w/ outpatient care since then, otherwise no previous psychiatric hospitalizations, no history of suicide attempts, who was BIB police under arrest after visiting her boyfriend and violating order of protection. Psychiatry was consulted as patient is endorsing SI.    Initial ED Assessment:  On interview the patient is dysphoric, tearful, somewhat anxious with racing speech. She acknowledges in the past saying things like she wants to die or wants to kill herself, which would usually be transient in response to a trigger, but recently she has been feeling more down and hopeless. She states she has not felt quite this bad before. Describes feeling "miserable" and "I don't wanna fix things, I don't care." She reports feeling like this immediately after getting out of rehab. Said her mood was flat and she had urges to get high again. She believes nothing in her life is going well and there is nothing to live for ("everything sucks"). At the same time, she denies any urge to harm herself saying her daughters have always been a strong protective factor. She has no plan or ideation about ending her own life, and instead says repeatedly that she just no longer wants to "feel this way."    Current Assessment:  Patient was seen and evaluated this morning. Chart was reviewed and no acute events were noted. PRN medications were administered overnight: ativan, nicotine gum, atarax, ibuprofen. Upon approach, patient is walking around the unit. Patient is alert and oriented and engages with interviewer. Patient is cooperative and answers all questions appropriately. She reports feeling as though there is no one there for her, and that her life has not been the same ever since she's had less time with her daughters. She reports having mood swings, which have become worse. She reports either sleeping too much or too little, feels guilty, low energy (but can easily switch to high energy), poor concentration, and suicidal ideation of overdosing on clonidine. She denies AVH. She reports there have been periods of time where she could go 1-3 days without any sleep (while not on any illicit substances).       Charisse Kirkland | Reference #: [id number]    Rx Written	Rx Dispensed	Drug	Quantity	Days Supply	Prescriber Name	Prescriber Aspen #	Payment Method	Dispenser  02/14/2023	02/14/2023	vyvanse 50 mg capsule	30	30	Bhalodi, Celestino	[id number]	Medicaid	[id number]  02/13/2023	02/13/2023	buprenorphine-naloxone 2-0.5 mg sl tablet	63	21	Ciarra Jolley MD	[id number]	Medicaid	[facility] #[id number]  02/10/2023	02/10/2023	buprenorphine-naloxone 8-2 mg sl film	3	3	Grady Caldwell	[id number]	Medicaid	[id number]  10/07/2022	10/26/2022	dextroamp-amphet er 10 mg cap	30	30	Bhalodi, Celestino	[id number]	[facility] #[id number]  10/07/2022	10/18/2022	vyvanse 50 mg capsule	30	30	Bhalodi, Celestino	[id number]	Medicaid	[facility][id number]  09/08/2022	09/26/2022	dextroamp-amphet er 10 mg cap	30	30	Bhalodi, Celestino	[id number]	[facility] #[id number]    Rx Written	Rx Dispensed	Drug	Quantity	Days Supply	Prescriber Name	Prescriber Aspen #	Payment Method	Dispenser  03/16/2023	03/16/2023	buprenorphine-naloxone 2-0.5 mg sl film	90	30	Stephanie Sheth (LIANNA)	[id number]	Medicaid	[facility] #[id number]  03/08/2023	03/08/2023	buprenorphine-naloxone 2-0.5 mg sl film	21	7	Marichuy Thornton	[id number]	Medicaid	[facility] #[id number]  02/23/2023	02/23/2023	buprenorphine-naloxone 2-0.5 mg sl film	14	7	Chica Thornton A, PA	[id number]	Medicaid	[facility] #[id number]

## 2023-04-24 NOTE — BH DISCHARGE NOTE NURSING/SOCIAL WORK/PSYCH REHAB - PATIENT PORTAL LINK FT
You can access the FollowMyHealth Patient Portal offered by [facility] by registering at the following website: [email or web address]. By joining [facility]’s FollowMyHealth portal, you will also be able to view your health information using other applications (apps) compatible with our system.

## 2023-04-24 NOTE — BH DISCHARGE NOTE NURSING/SOCIAL WORK/PSYCH REHAB - NSCDUDCCRISIS_PSY_A_CORE
[facility] Well  1 [phone]) [facility]-WELL ([phone])  Text "WELL" to [phone]  Website: www.[place]/.[employer] [facility] 1 (546) 621-HOPE (2207) Website: www.safehorizon.org/.National Suicide Prevention Lifeline 8 (856) 920-2653/.  [employer]  1 (576) LIFENET (019-2581)/988 Suicide and Crisis Lifeline

## 2023-04-24 NOTE — BH INPATIENT PSYCHIATRY PROGRESS NOTE - NSBHMETABOLIC_PSY_ALL_CORE_FT
BMI: BMI (kg/m2): 27.8 (04-19-23 @ 17:48)  HbA1c:   Glucose:   BP: 111/66 (04-21-23 @ 08:19) (102/68 - 125/69)  Lipid Panel: Date/Time: 04-20-23 @ 07:53  Cholesterol, Serum: 180  Direct LDL: --  HDL Cholesterol, Serum: 53  Total Cholesterol/HDL Ration Measurement: --  Triglycerides, Serum: 63  
BMI: BMI (kg/m2): 27.8 (04-19-23 @ 17:48)  HbA1c:   Glucose:   BP: 100/67 (04-23-23 @ 15:41) (93/48 - 130/60)  Lipid Panel: Date/Time: 04-20-23 @ 07:53  Cholesterol, Serum: 180  Direct LDL: --  HDL Cholesterol, Serum: 53  Total Cholesterol/HDL Ration Measurement: --  Triglycerides, Serum: 63  
BMI: BMI (kg/m2): 27.8 (04-19-23 @ 17:48)  HbA1c:   Glucose:   BP: 136/65 (04-24-23 @ 07:38) (93/48 - 136/65)  Lipid Panel: Date/Time: 04-20-23 @ 07:53  Cholesterol, Serum: 180  Direct LDL: --  HDL Cholesterol, Serum: 53  Total Cholesterol/HDL Ration Measurement: --  Triglycerides, Serum: 63  
BMI: BMI (kg/m2): 27.8 (04-19-23 @ 17:48)  HbA1c:   Glucose:   BP: 93/48 (04-22-23 @ 09:50) (93/48 - 126/65)  Lipid Panel: Date/Time: 04-20-23 @ 07:53  Cholesterol, Serum: 180  Direct LDL: --  HDL Cholesterol, Serum: 53  Total Cholesterol/HDL Ration Measurement: --  Triglycerides, Serum: 63

## 2023-04-24 NOTE — BH INPATIENT PSYCHIATRY DISCHARGE NOTE - NSDCCPCAREPLAN_GEN_ALL_CORE_FT
PRINCIPAL DISCHARGE DIAGNOSIS  Diagnosis: MDD (major depressive disorder)  Assessment and Plan of Treatment:

## 2023-04-24 NOTE — BH INPATIENT PSYCHIATRY PROGRESS NOTE - NSBHFUPINTERVALHXFT_PSY_A_CORE
Patient was seen and evaluated this morning. Chart was reviewed and no acute events were noted. No PRN medications were administered overnight. Upon approach, patient is lying in bed comfortably. Patient is alert and oriented and engages with interviewer. Patient is cooperative and answers all questions appropriately. Patient reports not sleeping to well over weekend due to environment. Patient reports that Topamax is working well for her anxiety and mood. She denies any side effects from the medications. She reports "feeling a lot better" and denies any suicidal ideation, intent or plan. She reports that she still would like to take care of some things such as her health insurance etc over the next week or two prior to going to an inpatient rehab.
Chart was reviewed and no acute events were noted. Behavioral PRNs overnight - atarax for anxiety.     Patient reports insomnia for 2nd day and increase anxiety. Reported poor effect of atarax to help with anxiety overnight. Appears anxious, jumping from topic to topic, but understandable. Describes craving for cocaine and previous benefit for topamax towards craving, requesting use now. Requests stopping buspar due to limited effect and having used medication since last rehab. Patient visible on unit, states she is trying to remain out of her room. Denies suicidal ideation, thoughts of self harm.
Patient seen and interviewed in her room,   She reports that she is feeling better attributing it to the Topamax however reporting that she became sad later in the day yesterday . She also reports poor sleep , deciding that the trazodone is not working . She was informed that the dose of  this medication can be increased to 100mg from 50mg and she was agreeable .   Patient reports that she is less depressed and no longer having suicidal thoughts .     As per nursing staff, patient is in good behaviuoral control and is taking her medications . 
Patient was seen and evaluated this morning. Chart was reviewed and no acute events were noted. PRN medications administered overnight include motrin, ativan, atarax, nicotine gum. Upon approach, patient is lying in bed comfortably. Patient is alert and oriented and engages with interviewer. Patient is cooperative and answers all questions appropriately. Patient reports poor sleep last night. She reports good appetite. She reports that she would consider inpatient rehab, however, she has many things she wants to get done prior such as getting her insurance secured as well as getting her glasses. She denies any suicidal ideation or intent.     Called [phone] (rehab that patient was discharged from on 3/23/23)- patient was discharged on these medications:  - strattera 40mg qd  - gabapentin 800mg BID  - suboxone 2mg TID  - welbutrin XL 300mg qd  - atarax 50mg q8 prn  - melatonin 3mg qhs prn

## 2023-04-24 NOTE — BH INPATIENT PSYCHIATRY PROGRESS NOTE - NSBHATTESTBILLING_PSY_A_CORE
patient c/o hypertension with dizzy spells and headache, was seen by PMD and was monitoring her BP at home and was found to be high. today BP was too high.
62665-Maqukcncft OBS or IP - low complexity OR 25-34 mins
76667-Xiqruwuwyv OBS or IP - low complexity OR 25-34 mins
44845-Wejbwlrcfn OBS or IP - moderate complexity OR 35-49 mins
95428-Ufywvlqqgf OBS or IP - low complexity OR 25-34 mins

## 2023-04-24 NOTE — BH INPATIENT PSYCHIATRY DISCHARGE NOTE - NSBHMETABOLIC_PSY_ALL_CORE_FT
BMI: BMI (kg/m2): 27.8 (04-19-23 @ 17:48)  HbA1c:   Glucose:   BP: 136/65 (04-24-23 @ 07:38) (93/48 - 136/65)  Lipid Panel: Date/Time: 04-20-23 @ 07:53  Cholesterol, Serum: 180  Direct LDL: --  HDL Cholesterol, Serum: 53  Total Cholesterol/HDL Ration Measurement: --  Triglycerides, Serum: 63
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
## 2023-04-24 NOTE — BH INPATIENT PSYCHIATRY PROGRESS NOTE - NSBHATTESTCOMMENTATTENDFT_PSY_A_CORE
Interviewed patient with the resident Dr. Fishman. As per nurses, no acute behavioral issues, patient has been pleasant. Patient was seen in her room, she presented anxious and c/o cocaine craving. She asked to switch her from Buspar to topiramate because in the past she had a good response to topiramate which helped her with drug craving. Buspirone was dc/'d, topiramate started. Agree with resident's assessment and plan.
Discussed clinical aspects of case with resident. I concur with findings and plan.
Discussed patient's treatment with resident.  I concur with findings and plan.

## 2023-04-24 NOTE — BH INPATIENT PSYCHIATRY PROGRESS NOTE - NSBHMSESPEECH_PSY_A_CORE
Abnormal as indicated, otherwise normal...
Normal volume, rate, productivity, spontaneity and articulation

## 2023-04-24 NOTE — BH INPATIENT PSYCHIATRY DISCHARGE NOTE - HOSPITAL COURSE
Tova Davis is a 41 yo  woman, resides at [facility] ([facility]) for past 6 months, previously living with boyfriend of 7 years, has two kids ages 12 and 9 under care of her sister, unemployed, with no significant PMH, and past psych hx of polysubstance abuse (opioid, cannabis, etoh, methamphetamine, cocaine), on Suboxone, recently discharged from inpatient rehab 2-3 weeks ago, non-compliant w/ outpatient care since then, otherwise no previous psychiatric hospitalizations, no history of suicide attempts, who was BIB police under arrest after visiting her boyfriend and violating order of protection. Psychiatry was consulted as patient is endorsing SI.    Pt was seen, evaluated, chart reviewed.  As per nursing staff, no events overnight. On evaluation, pt reports that she is doing well. Offered no new complaints. Is compliant with medication, denies negative side effects. Endorsed good sleep and appetite. Denied A/V hallucinations. Denied paranoia. Denied suicidal/homicidal ideation, intent or plan. Pt is for discharge today. Agreeable with outpatient follow up.     4-21-23: medications changed to home doses as confirmed from [facility] (discharged 3/23/23)  4-22-23: start topiramate 25mg qd   4-23-23: increase trazodone 50mg qhs to 100mg qhs    #Unspecified mood disorder  - increased Wellbutrin XL 150mg to 300mg qd  - restarted patient on buspar 5mg TID  - c/w trazodone 100mg qhs (increased to 100mg 4/23)  - c/w Topamax 25mg daily     #ADHD  - patient on Strattera 40mg qd-- pharmacy will not have until Monday 4/24    #Opioid use disorder  - c/w suboxone 2mg TID  - increased gabapentin 600mg qd to 800mg BID  - reports clonidine 0.1mg qd-- latest BP 4/20 120/76-- pt not discharged on this medication from [facility]

## 2023-04-24 NOTE — BH INPATIENT PSYCHIATRY PROGRESS NOTE - NSICDXBHSECONDARYDX_PSY_ALL_CORE
Alcohol use disorder   F19.90  Cocaine use disorder   F14.10  Opioid use disorder   F11.90  

## 2023-04-24 NOTE — BH INPATIENT PSYCHIATRY DISCHARGE NOTE - NSDCMRMEDTOKEN_GEN_ALL_CORE_FT
atomoxetine 40 mg oral capsule: 1 cap(s) orally once a day  buprenorphine-naloxone 2 mg-0.5 mg sublingual tablet: 1 tab(s) sublingual 3 times a day  buPROPion 300 mg/24 hours (XL) oral tablet, extended release: 1 tab(s) orally once a day  gabapentin 800 mg oral tablet: 1 tab(s) orally 2 times a day  hydrOXYzine hydrochloride 50 mg oral tablet: 1 tab(s) orally once a day (at bedtime) As needed sleep  nicotine 21 mg/24 hr transdermal film, extended release: 1 patch transdermal once a day  ocular lubricant ophthalmic solution: 1 drop(s) to each affected eye once a day As needed dry eyes  pantoprazole 40 mg oral delayed release tablet: 1 tab(s) orally once a day (before a meal)  topiramate 25 mg oral tablet: 1 tab(s) orally once a day  traZODone 100 mg oral tablet: 1 tab(s) orally once a day (at bedtime)  traZODone 50 mg oral tablet: 1 tab(s) orally once a day (at bedtime) As needed insomnia   atomoxetine 40 mg oral capsule: 1 cap(s) orally once a day MDD: 40 mg  buprenorphine-naloxone 2 mg-0.5 mg sublingual tablet: 1 tab(s) sublingual 3 times a day MDD: 6 mg  buPROPion 300 mg/24 hours (XL) oral tablet, extended release: 1 tab(s) orally once a day MDD: 300 mg  gabapentin 800 mg oral tablet: 1 tab(s) orally 2 times a day MDD: 1600 mg  hydrOXYzine hydrochloride 50 mg oral tablet: 1 tab(s) orally once a day (at bedtime) as needed for  anxiety MDD: 50 mg  melatonin 3 mg oral tablet: 1 tab(s) orally once a day (at bedtime) as needed for  insomnia MDD: 3 mg  nicotine 2 mg oral transmucosal gum: 1 gum chewed every 2 to 3 hours as needed for nicotine dependence without withdrawal  nicotine 21 mg/24 hr transdermal film, extended release: 1 patch transdermal once a day MDD: 21 mg  ocular lubricant ophthalmic solution: 1 drop(s) to each affected eye once a day As needed dry eyes  pantoprazole 40 mg oral delayed release tablet: 1 tab(s) orally once a day (before a meal)  topiramate 25 mg oral tablet: 1 tab(s) orally once a day MDD: 25 mg  traZODone 100 mg oral tablet: 1 tab(s) orally once a day (at bedtime) MDD: 100 mg  traZODone 50 mg oral tablet: 1 tab(s) orally once a day (at bedtime) as needed for insomnia MDD: 50 mg

## 2023-04-24 NOTE — BH INPATIENT PSYCHIATRY PROGRESS NOTE - NSBHCHARTREVIEWINVESTIGATE_PSY_A_CORE FT
Ventricular Rate 68 BPM    Atrial Rate 68 BPM    P-R Interval 148 ms    QRS Duration 78 ms    Q-T Interval 436 ms    QTC Calculation(Bazett) 463 ms    P Axis 55 degrees    R Axis 64 degrees    T Axis 67 degrees    Diagnosis Line Normal sinus rhythm  Normal ECG    Confirmed by KAZ HANKS, JOYCE ([id number]) on 4/20/2023 10:43:02 AM
Ventricular Rate 68 BPM    Atrial Rate 68 BPM    P-R Interval 148 ms    QRS Duration 78 ms    Q-T Interval 436 ms    QTC Calculation(Bazett) 463 ms    P Axis 55 degrees    R Axis 64 degrees    T Axis 67 degrees    Diagnosis Line Normal sinus rhythm  Normal ECG    Confirmed by KAZ HANKS, JOYCE ([id number]) on 4/20/2023 10:43:02 AM

## 2023-04-24 NOTE — BH DISCHARGE NOTE NURSING/SOCIAL WORK/PSYCH REHAB - NSDCPEEMAIL_GEN_ALL_CORE
[facility] Tobacco [facility] email [email or web address]

## 2023-04-24 NOTE — BH INPATIENT PSYCHIATRY DISCHARGE NOTE - NSICDXPASTMEDICALHX_GEN_ALL_CORE_FT
PAST MEDICAL HISTORY:  Anxiety     Depression     GERD (gastroesophageal reflux disease)     Substance abuse     Type 2 diabetes mellitus

## 2023-04-24 NOTE — BH INPATIENT PSYCHIATRY DISCHARGE NOTE - NSBHDCMEDICALFT_PSY_A_CORE
Patient called back   Letter not sent   Patient responded in separate encounter.  Closing this encounter  Please see e-advice 3/5/21   None

## 2023-04-24 NOTE — BH INPATIENT PSYCHIATRY DISCHARGE NOTE - OTHER PAST PSYCHIATRIC HISTORY (INCLUDE DETAILS REGARDING ONSET, COURSE OF ILLNESS, INPATIENT/OUTPATIENT TREATMENT)
Patient reports PPH of anxiety, depression, ADD  Does not currently have a psychiatrist.  No prior IPP hospitalizations.  No prior suicide attempts.

## 2023-04-25 VITALS
HEART RATE: 96 BPM | SYSTOLIC BLOOD PRESSURE: 118 MMHG | TEMPERATURE: 98 F | DIASTOLIC BLOOD PRESSURE: 70 MMHG | RESPIRATION RATE: 18 BRPM

## 2023-04-25 PROCEDURE — 99231 SBSQ HOSP IP/OBS SF/LOW 25: CPT

## 2023-04-25 RX ORDER — BUPRENORPHINE AND NALOXONE 2; .5 MG/1; MG/1
1 TABLET SUBLINGUAL
Qty: 5 | Refills: 0
Start: 2023-04-25 | End: 2023-05-08

## 2023-04-25 RX ORDER — LANOLIN ALCOHOL/MO/W.PET/CERES
1 CREAM (GRAM) TOPICAL
Qty: 14 | Refills: 0
Start: 2023-04-25 | End: 2023-05-08

## 2023-04-25 RX ORDER — NICOTINE POLACRILEX 2 MG
1 GUM BUCCAL
Qty: 6 | Refills: 0
Start: 2023-04-25 | End: 2023-05-08

## 2023-04-25 RX ADMIN — ATOMOXETINE HYDROCHLORIDE 40 MILLIGRAM(S): 10 CAPSULE ORAL at 08:45

## 2023-04-25 RX ADMIN — PANTOPRAZOLE SODIUM 40 MILLIGRAM(S): 20 TABLET, DELAYED RELEASE ORAL at 06:30

## 2023-04-25 RX ADMIN — BUPROPION HYDROCHLORIDE 300 MILLIGRAM(S): 150 TABLET, EXTENDED RELEASE ORAL at 08:14

## 2023-04-25 RX ADMIN — Medication 1 PATCH: at 11:47

## 2023-04-25 RX ADMIN — Medication 25 MILLIGRAM(S): at 08:14

## 2023-04-25 RX ADMIN — BUPRENORPHINE AND NALOXONE 1 TABLET(S): 2; .5 TABLET SUBLINGUAL at 14:35

## 2023-04-25 RX ADMIN — Medication 400 MILLIGRAM(S): at 08:14

## 2023-04-25 RX ADMIN — Medication 400 MILLIGRAM(S): at 08:50

## 2023-04-25 RX ADMIN — BUPRENORPHINE AND NALOXONE 1 TABLET(S): 2; .5 TABLET SUBLINGUAL at 06:30

## 2023-04-25 RX ADMIN — GABAPENTIN 800 MILLIGRAM(S): 400 CAPSULE ORAL at 08:14

## 2023-04-25 RX ADMIN — Medication 1 PATCH: at 07:47

## 2023-04-25 NOTE — CHART NOTE - NSCHARTNOTEFT_GEN_A_CORE
Patient was seen and evaluated this morning. Chart was reviewed and no acute events were noted. PRN nicotine gum administered overnight. Upon approach, patient is sitting in bed comfortably. Patient is alert and oriented and engages with interviewer. Patient is cooperative and answers all questions appropriately. Patient reports good effect from antidepressant medication. Patient is very future oriented, motivated to accomplish tasks once discharged. Insight has improved significantly since admission. Denies any suicidal ideation, intent or plan.

## 2023-05-01 DIAGNOSIS — R45.851 SUICIDAL IDEATIONS: ICD-10-CM

## 2023-05-01 DIAGNOSIS — F33.9 MAJOR DEPRESSIVE DISORDER, RECURRENT, UNSPECIFIED: ICD-10-CM

## 2023-05-01 DIAGNOSIS — F10.120 ALCOHOL ABUSE WITH INTOXICATION, UNCOMPLICATED: ICD-10-CM

## 2023-05-01 DIAGNOSIS — Z88.8 ALLERGY STATUS TO OTHER DRUGS, MEDICAMENTS AND BIOLOGICAL SUBSTANCES: ICD-10-CM

## 2023-05-01 DIAGNOSIS — Z88.2 ALLERGY STATUS TO SULFONAMIDES: ICD-10-CM

## 2023-05-01 DIAGNOSIS — F41.9 ANXIETY DISORDER, UNSPECIFIED: ICD-10-CM

## 2023-05-04 NOTE — BH SOCIAL WORK CONFIRMATION FOLLOW UP NOTE - NSCOMMENTS_PSY_ALL_CORE
Tova broke her outpatient mental health appointment with [facility] on 4/28. SW Assistant sent a referral for a wellness check via [facility] mobile crisis unit on 4/28. Mobile Crisis Unit made three attempts to make contact with patient and states "address is incorrect". (pt not at address given) Patient's whereabouts are unknown.  Tova broke her outpatient mental health appointment with [facility] on 4/28. SW Assistant sent a referral for a wellness check via [facility] mobile crisis unit on 4/28. Mobile Crisis Unit made three attempts to make contact with patient and states "address is incorrect" (pt not at address given). Patient's whereabouts are unknown.

## 2023-05-20 NOTE — ED PROVIDER NOTE - DIFFERENTIAL DIAGNOSIS
Differential Diagnosis SI due to life stressors vs substance induced mood disorder, etoh abuse I personally performed the service described in the documentation recorded by the scribe in my presence, and it accurately and completely records my words and actions.

## 2023-11-18 NOTE — BH PATIENT PROFILE - NSVRISKABUSE_PSY_ALL_CORE
Maybe/Moderate

## 2024-01-19 NOTE — BH SOCIAL WORK INITIAL PSYCHOSOCIAL EVALUATION - NSBHSACONSEQUENCE_PSY_A_CORE FT
[facility]  Discharge Note  Short Stay    Procedure(s) (LRB):  ROBOTIC HYSTERECTOMY,WITH SALPINGO-OOPHORECTOMY (Bilateral)      OUTCOME: Patient tolerated treatment/procedure well without complication and is now ready for discharge.    DISPOSITION: Home or Self Care    FINAL DIAGNOSIS:  Endometrial hyperplasia without atypia    FOLLOWUP: In clinic    DISCHARGE INSTRUCTIONS:    Discharge Procedure Orders   Diet general     Sponge bath only until clinic visit     Keep surgical extremity elevated     Ice to affected area     Lifting restrictions     Other restrictions (specify):   Order Comments: Discharge Instructions:  Follow-up in 2 weeks or as needed.  Call immediately for any abnormal pain bleeding fever chills nausea vomiting or other significant postoperative issues.    Pelvic rest until follow-up.  No tub baths until follow-up.  Diet as tolerated     Call MD for:  temperature >100.4     Call MD for:  persistent nausea and vomiting     Call MD for:  severe uncontrolled pain     Call MD for:  difficulty breathing, headache or visual disturbances     Call MD for:  redness, tenderness, or signs of infection (pain, swelling, redness, odor or green/yellow discharge around incision site)     Call MD for:  hives     Call MD for:  persistent dizziness or light-headedness     Call MD for:  extreme fatigue     Call MD for:   Order Comments: Discharge Instructions:  Follow-up in 2 weeks or as needed.  Call immediately for any abnormal pain bleeding fever chills nausea vomiting or other significant postoperative issues.    Pelvic rest until follow-up.  No tub baths until follow-up.  Diet as tolerated     No dressing needed     Activity as tolerated     Shower on day dressing removed (No bath)   Order Comments: No tub baths until patient seen for postoperative evaluation in the office        TIME SPENT ON DISCHARGE: 10 minutes    Discharge medications:  Motrin, Percocet and Zofran     The patient  may be discharged home when she is ambulating, tolerating clear liquids, able to take pain medications by mouth, has no significant nausea, has urinated and is in agreement with discharge plan.    Giovanny Natarajan MD  OBGYN [facility]   has been to detox, rehab
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
## 2024-11-29 NOTE — BH INPATIENT PSYCHIATRY ASSESSMENT NOTE - NSTOBACCOMEDADM_PSY_A_CORE
Anesthesia Post-op Note    Patient: Poppy Ruiz  Procedure(s) Performed:  SECTION (Abdomen)  Anesthesia type: L&D Epidural    Vitals Value Taken Time   Temp 36.5 °C (97.7 °F) 24 1035   Pulse 77 24 1035   Resp 16 24 1035   SpO2 98 % 24 1035   /74 24 1035         Patient Location: [facility] Surgical Floor  Level of Consciousness: participates in exam, awake, oriented, answers questions appropriately and alert  Respiratory Status: spontaneous ventilation and room air  Cardiovascular stable  Hydration: euvolemic  Pain Management: adequately controlled  Vomiting: none  Nausea: None  Airway Patency:patent  Post-op Assessment: awake, alert, appropriately conversant, or baseline, no complications and patient tolerated procedure well  Comments: No post dural puncture headache at this time.   No neurological complications.  Patient ambulating, pain controlled       No notable events documented.                      
Anesthesia Post-op Note    Patient: Poppy Ruiz  Procedure(s) Performed:  SECTION (Abdomen)  Anesthesia type: L&D Epidural    Vitals Value Taken Time   Temp 98.4 24 0336   Pulse 88 24 0335   Resp 18 24 0336   SpO2 97 % 24 0335   /60 24 0332   Vitals shown include unfiled device data.      Patient Location: PACU Phase 1  Post-op Vital Signs:stable  Level of Consciousness: participates in exam, awake, alert and oriented  Respiratory Status: spontaneous ventilation, unassisted and room air  Cardiovascular blood pressure returned to baseline  Hydration: euvolemic  Pain Management: well controlled  Handoff: Handoff to receiving clinician was performed and questions were answered  Vomiting: none  Nausea: None  Airway Patency:patent  Post-op Assessment: no complications, patient tolerated procedure well and regional anesthetic in place - able to participate      No notable events documented.                      
Prescription provided

## 2025-04-10 NOTE — BH INPATIENT PSYCHIATRY PROGRESS NOTE - NSTXSUICIDGOAL_PSY_ALL_CORE
Occupational Therapy Visit    Visit Type: Daily Treatment Note  Visit: 2  Referring Provider: STEW Phillips  Medical Diagnosis (from order): [unfilled]     SUBJECTIVE                                                                                                               \"It feels a little worse today. It might be the exercises. I mean I use it at home but now the exercises are extra. It feels more like painful stretching.\" Pt does not feel that the tape helped. Pt has been icing and gets momentary relief.     Pain / Symptoms  - Pain rating (out of 10): Current: 4 ([facility]) ; Worst: 7     OBJECTIVE                                                                                                                     Range of Motion (ROM)   (degrees unless noted; active unless noted; norms in ( ); negative=lacking to 0, positive=beyond 0)  Elbow/Forearm:   - Supination (80):       Right: 70 Passive: 75  Wrist:   - Flexion (60-80):       Right: 45 pain    - Extension (60-70):       Right: 49                  Treatment    Ultrasound  - Position: sitting  - Duty Cycle: 50%  - Frequency: 3 Mhz  : .8.     - Intensity: patient comfort  - Duration: 8 minutes    Results: improved range of motion and tissue softening  Reaction: no adverse reaction to treatment      Therapeutic Exercise  Discuss status and chart review    Review HEP: Educate to complete less reps but spread more throughout the day. Pt verbalized understanding.  - Wrist AROM:    - Flexion/Extension, RD/UD, Sup/Pro   - Tendon glides:    - Hook fist, Straight fist, MP flexion, Full fist  - Opposition to each finger with wide spread between touches    Reassess wrist/forearm ROM after Ultrasound and Manual    Placement of wrist widget. Educate pt on correct alignment; pt continues to have some pain with pressure of widget on distal wrap. Trial comfort cool; pt unsure if it would be beneficial.     Educate pt on use of heat vs. Ice for comfort at home:   - Heat 
with chronic pain, aching symptoms and feelings of tightness/stiffness.   - Ice with acute pain, inflammation and post therapy soreness.    Manual Therapy   STM with ultrasound gel to volar, dorsal medial aspect of forearm; volar, dorsal medial aspect of palm   PROM forearm supination/pronation, wrist flexion/extension    Skilled input: verbal instruction/cues and tactile instruction/cues    Writer verbally educated and received verbal consent for hand placement, positioning of patient, and techniques to be performed today from patient for hand placement and palpation for techniques as described above and how they are pertinent to the patient's plan of care.  Home Exercise Program  Wrist AROM  Flexor tendon glides  Opposition      ASSESSMENT                                                                                                            Pt reporting more soreness with HEP. Pocket of edema still noted surrounding ulnar styloid. Pulsed ultrasound complete for inflammation and pain with manual treatment to follow. Pt demonstrating large gains in ROM as noted by above measurements. Discontinue use of wrist widget due to increased pain with application. Continue per POC.   Pain/symptoms after session (out of 10): 6  Education:   - Results of above outlined education: Verbalizes understanding and Demonstrates understanding    PLAN                                                                                                                           Suggestions for next session as indicated: Progress per plan of care,  pulsed ultrasound, STM/PROM, review HEP and progress       Therapy procedure time and total treatment time can be found documented on the Time Entry flowsheet  
Will verbalize a decrease in preoccupation with suicidal thoughts and / or intent to commit suicide to 2 on a 10-point scale

## 2025-06-17 NOTE — ED BEHAVIORAL HEALTH ASSESSMENT NOTE - EMPLOYMENT
Request PA button triggered by writer as this was not completed by provider's office at the time of this encounter.   Unemployed